# Patient Record
Sex: MALE | Race: WHITE | NOT HISPANIC OR LATINO | Employment: OTHER | URBAN - NONMETROPOLITAN AREA
[De-identification: names, ages, dates, MRNs, and addresses within clinical notes are randomized per-mention and may not be internally consistent; named-entity substitution may affect disease eponyms.]

---

## 2021-01-01 ENCOUNTER — APPOINTMENT (OUTPATIENT)
Dept: CT IMAGING | Facility: HOSPITAL | Age: 86
End: 2021-01-01

## 2021-01-01 ENCOUNTER — LAB (OUTPATIENT)
Dept: LAB | Facility: HOSPITAL | Age: 86
End: 2021-01-01

## 2021-01-01 ENCOUNTER — APPOINTMENT (OUTPATIENT)
Dept: GENERAL RADIOLOGY | Facility: HOSPITAL | Age: 86
End: 2021-01-01

## 2021-01-01 ENCOUNTER — HOSPITAL ENCOUNTER (EMERGENCY)
Facility: HOSPITAL | Age: 86
Discharge: ANOTHER HOSPITAL | End: 2021-10-24
Attending: EMERGENCY MEDICINE | Admitting: EMERGENCY MEDICINE

## 2021-01-01 ENCOUNTER — TRANSCRIBE ORDERS (OUTPATIENT)
Dept: LAB | Facility: HOSPITAL | Age: 86
End: 2021-01-01

## 2021-01-01 ENCOUNTER — HOSPITAL ENCOUNTER (EMERGENCY)
Facility: HOSPITAL | Age: 86
Discharge: HOME | End: 2021-12-08
Attending: EMERGENCY MEDICINE | Admitting: EMERGENCY MEDICINE

## 2021-01-01 ENCOUNTER — HOSPITAL ENCOUNTER (EMERGENCY)
Facility: HOSPITAL | Age: 86
Discharge: HOME | End: 2021-11-06
Attending: EMERGENCY MEDICINE | Admitting: EMERGENCY MEDICINE

## 2021-01-01 VITALS
OXYGEN SATURATION: 97 % | RESPIRATION RATE: 16 BRPM | SYSTOLIC BLOOD PRESSURE: 123 MMHG | HEIGHT: 67 IN | TEMPERATURE: 97.4 F | WEIGHT: 175 LBS | BODY MASS INDEX: 27.47 KG/M2 | DIASTOLIC BLOOD PRESSURE: 66 MMHG | HEART RATE: 70 BPM

## 2021-01-01 VITALS
WEIGHT: 168 LBS | SYSTOLIC BLOOD PRESSURE: 128 MMHG | RESPIRATION RATE: 16 BRPM | HEART RATE: 70 BPM | HEIGHT: 67 IN | BODY MASS INDEX: 26.37 KG/M2 | DIASTOLIC BLOOD PRESSURE: 75 MMHG | TEMPERATURE: 98 F | OXYGEN SATURATION: 100 %

## 2021-01-01 VITALS
HEIGHT: 67 IN | WEIGHT: 174 LBS | RESPIRATION RATE: 18 BRPM | HEART RATE: 67 BPM | BODY MASS INDEX: 27.31 KG/M2 | OXYGEN SATURATION: 100 % | DIASTOLIC BLOOD PRESSURE: 86 MMHG | SYSTOLIC BLOOD PRESSURE: 139 MMHG | TEMPERATURE: 98.1 F

## 2021-01-01 DIAGNOSIS — H11.32 SUBCONJUNCTIVAL HEMORRHAGE OF LEFT EYE: ICD-10-CM

## 2021-01-01 DIAGNOSIS — Z20.822 COVID-19 RULED OUT: ICD-10-CM

## 2021-01-01 DIAGNOSIS — S00.83XA TRAUMATIC HEMATOMA OF FOREHEAD, INITIAL ENCOUNTER: ICD-10-CM

## 2021-01-01 DIAGNOSIS — S50.312A ELBOW ABRASION, LEFT, INITIAL ENCOUNTER: ICD-10-CM

## 2021-01-01 DIAGNOSIS — Z20.822 COVID-19 RULED OUT: Primary | ICD-10-CM

## 2021-01-01 DIAGNOSIS — S06.5XAA SUBDURAL HEMATOMA (HCC): ICD-10-CM

## 2021-01-01 DIAGNOSIS — S01.81XA FOREHEAD LACERATION, INITIAL ENCOUNTER: Primary | ICD-10-CM

## 2021-01-01 DIAGNOSIS — S01.01XA LACERATION OF SCALP, INITIAL ENCOUNTER: ICD-10-CM
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
## 2021-01-01 DIAGNOSIS — S33.5XXA LUMBAR SPRAIN, INITIAL ENCOUNTER: Primary | ICD-10-CM

## 2021-01-01 DIAGNOSIS — S22.030A COMPRESSION FRACTURE OF T3 VERTEBRA, INITIAL ENCOUNTER (HCC): Primary | ICD-10-CM

## 2021-01-01 DIAGNOSIS — R29.6 FREQUENT FALLS: ICD-10-CM

## 2021-01-01 LAB
ALBUMIN SERPL-MCNC: 3.3 G/DL (ref 3.5–5.2)
ALBUMIN/GLOB SERPL: 1.4 G/DL
ALP SERPL-CCNC: 107 U/L (ref 39–117)
ALT SERPL W P-5'-P-CCNC: 16 U/L (ref 1–41)
ANION GAP SERPL CALCULATED.3IONS-SCNC: 6.7 MMOL/L (ref 5–15)
AST SERPL-CCNC: 22 U/L (ref 1–40)
BASOPHILS # BLD AUTO: 0.03 10*3/MM3 (ref 0–0.2)
BASOPHILS NFR BLD AUTO: 0.6 % (ref 0–1.5)
BILIRUB SERPL-MCNC: 0.2 MG/DL (ref 0–1.2)
BUN SERPL-MCNC: 27 MG/DL (ref 8–23)
BUN/CREAT SERPL: 19.1 (ref 7–25)
CALCIUM SPEC-SCNC: 8.3 MG/DL (ref 8.2–9.6)
CHLORIDE SERPL-SCNC: 110 MMOL/L (ref 98–107)
CO2 SERPL-SCNC: 25.3 MMOL/L (ref 22–29)
CREAT SERPL-MCNC: 1.41 MG/DL (ref 0.76–1.27)
DEPRECATED RDW RBC AUTO: 62.9 FL (ref 37–54)
EOSINOPHIL # BLD AUTO: 0.38 10*3/MM3 (ref 0–0.4)
EOSINOPHIL NFR BLD AUTO: 7.4 % (ref 0.3–6.2)
ERYTHROCYTE [DISTWIDTH] IN BLOOD BY AUTOMATED COUNT: 17.2 % (ref 12.3–15.4)
GFR SERPL CREATININE-BSD FRML MDRD: 47 ML/MIN/1.73
GLOBULIN UR ELPH-MCNC: 2.3 GM/DL
GLUCOSE SERPL-MCNC: 106 MG/DL (ref 65–99)
HCT VFR BLD AUTO: 29.9 % (ref 37.5–51)
HGB BLD-MCNC: 9.6 G/DL (ref 13–17.7)
IMM GRANULOCYTES # BLD AUTO: 0.02 10*3/MM3 (ref 0–0.05)
IMM GRANULOCYTES NFR BLD AUTO: 0.4 % (ref 0–0.5)
LYMPHOCYTES # BLD AUTO: 1.37 10*3/MM3 (ref 0.7–3.1)
LYMPHOCYTES NFR BLD AUTO: 26.6 % (ref 19.6–45.3)
MCH RBC QN AUTO: 32.2 PG (ref 26.6–33)
MCHC RBC AUTO-ENTMCNC: 32.1 G/DL (ref 31.5–35.7)
MCV RBC AUTO: 100.3 FL (ref 79–97)
MONOCYTES # BLD AUTO: 0.56 10*3/MM3 (ref 0.1–0.9)
MONOCYTES NFR BLD AUTO: 10.9 % (ref 5–12)
NEUTROPHILS NFR BLD AUTO: 2.8 10*3/MM3 (ref 1.7–7)
NEUTROPHILS NFR BLD AUTO: 54.1 % (ref 42.7–76)
NRBC BLD AUTO-RTO: 0 /100 WBC (ref 0–0.2)
PLATELET # BLD AUTO: 107 10*3/MM3 (ref 140–450)
PMV BLD AUTO: 9.4 FL (ref 6–12)
POTASSIUM SERPL-SCNC: 4.7 MMOL/L (ref 3.5–5.2)
PROT SERPL-MCNC: 5.6 G/DL (ref 6–8.5)
RBC # BLD AUTO: 2.98 10*6/MM3 (ref 4.14–5.8)
SARS-COV-2 RNA NOSE QL NAA+PROBE: NOT DETECTED
SODIUM SERPL-SCNC: 142 MMOL/L (ref 136–145)
WBC # BLD AUTO: 5.16 10*3/MM3 (ref 3.4–10.8)

## 2021-01-01 PROCEDURE — 72125 CT NECK SPINE W/O DYE: CPT

## 2021-01-01 PROCEDURE — U0004 COV-19 TEST NON-CDC HGH THRU: HCPCS

## 2021-01-01 PROCEDURE — 70486 CT MAXILLOFACIAL W/O DYE: CPT

## 2021-01-01 PROCEDURE — 72131 CT LUMBAR SPINE W/O DYE: CPT

## 2021-01-01 PROCEDURE — C9803 HOPD COVID-19 SPEC COLLECT: HCPCS

## 2021-01-01 PROCEDURE — 73080 X-RAY EXAM OF ELBOW: CPT

## 2021-01-01 PROCEDURE — 99283 EMERGENCY DEPT VISIT LOW MDM: CPT

## 2021-01-01 PROCEDURE — 72220 X-RAY EXAM SACRUM TAILBONE: CPT

## 2021-01-01 PROCEDURE — 70450 CT HEAD/BRAIN W/O DYE: CPT

## 2021-01-01 PROCEDURE — 80053 COMPREHEN METABOLIC PANEL: CPT | Performed by: PHYSICIAN ASSISTANT

## 2021-01-01 PROCEDURE — 71250 CT THORAX DX C-: CPT

## 2021-01-01 PROCEDURE — 72128 CT CHEST SPINE W/O DYE: CPT

## 2021-01-01 PROCEDURE — 72100 X-RAY EXAM L-S SPINE 2/3 VWS: CPT

## 2021-01-01 PROCEDURE — 85025 COMPLETE CBC W/AUTO DIFF WBC: CPT | Performed by: PHYSICIAN ASSISTANT

## 2021-01-01 PROCEDURE — 74176 CT ABD & PELVIS W/O CONTRAST: CPT

## 2021-01-01 RX ORDER — BACITRACIN ZINC 500 [USP'U]/G
1 OINTMENT TOPICAL ONCE
Status: COMPLETED | OUTPATIENT
Start: 2021-01-01 | End: 2021-01-01

## 2021-01-01 RX ORDER — LIDOCAINE HYDROCHLORIDE 10 MG/ML
10 INJECTION, SOLUTION INFILTRATION; PERINEURAL ONCE
Status: DISCONTINUED | OUTPATIENT
Start: 2021-01-01 | End: 2021-01-01 | Stop reason: HOSPADM

## 2021-01-01 RX ORDER — LIDOCAINE HYDROCHLORIDE AND EPINEPHRINE 10; 10 MG/ML; UG/ML
10 INJECTION, SOLUTION INFILTRATION; PERINEURAL ONCE
Status: COMPLETED | OUTPATIENT
Start: 2021-01-01 | End: 2021-01-01

## 2021-01-01 RX ORDER — ACETAMINOPHEN 500 MG
1000 TABLET ORAL ONCE
Status: COMPLETED | OUTPATIENT
Start: 2021-01-01 | End: 2021-01-01

## 2021-01-01 RX ADMIN — LIDOCAINE HYDROCHLORIDE,EPINEPHRINE BITARTRATE 10 ML: 10; .01 INJECTION, SOLUTION INFILTRATION; PERINEURAL at 18:17

## 2021-01-01 RX ADMIN — ACETAMINOPHEN 1000 MG: 500 TABLET ORAL at 13:16

## 2021-01-01 RX ADMIN — BACITRACIN ZINC 1 APPLICATION: 500 OINTMENT TOPICAL at 16:07

## 2021-10-25 NOTE — ED PROVIDER NOTES
Subjective   89-year-old male presents with a fall, he fell earlier today hitting his head and has a knot on his right side of his head.  He also fell about 30 minutes before that fall landing on his left elbow.  He had a fall several days ago landing on his right elbow and has a bandage from a skin tear.  He is here with his daughter who states he is following 176 times since 2014.  He has had multiple falls recently and is become concerning.  He is also concerned because his blood pressures have been low at home, he is on several blood pressure medications and when he takes his blood pressure at home he notices systolic is lower and he is concerned this may be causing some of his falls, his daughter states that they were told that recently by his physician.  He is only complaining of pain in the forehead area where his knot is.      History provided by:  Patient and relative   used: No        Review of Systems   HENT:        Knot on forehead   Skin:        Skin tear right arm   Neurological: Positive for weakness.   All other systems reviewed and are negative.      Past Medical History:   Diagnosis Date   • Arthritis    • CHF (congestive heart failure) (HCC)    • Sleep apnea        No Known Allergies    Past Surgical History:   Procedure Laterality Date   • BACK SURGERY     • JOINT REPLACEMENT     • PACEMAKER IMPLANTATION         History reviewed. No pertinent family history.    Social History     Socioeconomic History   • Marital status:    Tobacco Use   • Smoking status: Never Smoker   Substance and Sexual Activity   • Alcohol use: Never   • Drug use: Never           Objective   Physical Exam  Vitals and nursing note reviewed.   Constitutional:       Appearance: He is well-developed.   HENT:      Head: Normocephalic and atraumatic.        Comments: Proximately 2 cm hematoma right forehead  Cardiovascular:      Rate and Rhythm: Normal rate and regular rhythm.   Pulmonary:      Effort:  Pulmonary effort is normal.      Breath sounds: Normal breath sounds.   Abdominal:      General: Bowel sounds are normal.      Palpations: Abdomen is soft.   Musculoskeletal:      Cervical back: Normal range of motion.   Skin:     General: Skin is warm and dry.   Neurological:      Mental Status: He is alert and oriented to person, place, and time.   Psychiatric:         Behavior: Behavior normal.         Thought Content: Thought content normal.         Procedures           ED Course  ED Course as of 10/24/21 2248   Sun Oct 24, 2021   2239 Discussed the case with Dr. Young  trauma service, she accepted the patient for transfer [CS]      ED Course User Index  [CS] Ajay Ortiz Jr., PA-C                                           MDM  Number of Diagnoses or Management Options  Compression fracture of T3 vertebra, initial encounter (HCC): new and requires workup  Subdural hematoma (HCC): new and requires workup     Amount and/or Complexity of Data Reviewed  Clinical lab tests: reviewed  Tests in the radiology section of CPT®: reviewed    Risk of Complications, Morbidity, and/or Mortality  Presenting problems: minimal  Diagnostic procedures: minimal    Patient Progress  Patient progress: stable      Final diagnoses:   Compression fracture of T3 vertebra, initial encounter (HCC)   Subdural hematoma (HCC)       ED Disposition  ED Disposition     ED Disposition Condition Comment    Transfer to Another Facility             No follow-up provider specified.       Medication List      No changes were made to your prescriptions during this visit.          Ajay Ortiz Jr., PA-C  10/24/21 2248

## 2021-11-06 NOTE — ED PROVIDER NOTES
Subjective   History of Present Illness   Patient is a 89-year-old male with history of CHF, sleep apnea, arthritis, and frequent falls presenting to the ER with complaints of head trauma and left elbow pain secondary to a fall at home.  Patient states that he was trying to clean the stove top and the floor came up and hit him.  He states that he lost his balance and hit his head on the top of the countertop. He states he has cuts to his forehead and left elbow. He denies pain anywhere else and denies additional injuries/complaints at this time. Per review of records, patient was admitted at  from 10/25/21-10/26/21 for subdural hematoma, vertebral compression fracture, and rib fractures secondary to a fall. Per previous notes, patient has reportedly had 176 falls since 2014. Patient was evaluated by OT and PT during hospitalization and home with assistance recommended. He usually takes aspirin daily but has not taken it since  Admission at  for previous fall (10/26/21) as instructed by providers upon discharge. He states he was supposed to start taking it again tomorrow.     Review of Systems   HENT:        Head trauma, blood to left eye   Skin: Positive for wound (left elbow and forehead).   All other systems reviewed and are negative.      Past Medical History:   Diagnosis Date   • Arthritis    • CHF (congestive heart failure) (HCC)    • Falls frequently    • Sleep apnea        No Known Allergies    Past Surgical History:   Procedure Laterality Date   • BACK SURGERY     • JOINT REPLACEMENT     • PACEMAKER IMPLANTATION         History reviewed. No pertinent family history.    Social History     Socioeconomic History   • Marital status:    Tobacco Use   • Smoking status: Never Smoker   Vaping Use   • Vaping Use: Never used   Substance and Sexual Activity   • Alcohol use: Never   • Drug use: Never   • Sexual activity: Not Currently           Objective   Physical Exam  Vitals and nursing note reviewed.    Constitutional:       General: He is not in acute distress.     Appearance: He is normal weight. He is not toxic-appearing.   HENT:      Head:      Comments: Large linear laceration to forehead which will require sutures, skin tear to left elbow (not amenable to suturing), no obvious hematomas to head     Right Ear: External ear normal.      Left Ear: External ear normal.      Nose: Nose normal.   Eyes:      Extraocular Movements: Extraocular movements intact.      Pupils: Pupils are equal, round, and reactive to light.      Comments: Subconjunctival hematoma to left eye, PERRl, EOM intact, mild swelling of the periocular region of left eye   Cardiovascular:      Rate and Rhythm: Normal rate.      Heart sounds: Normal heart sounds.   Pulmonary:      Effort: No respiratory distress.      Breath sounds: Normal breath sounds. No wheezing.   Abdominal:      General: There is no distension.      Palpations: Abdomen is soft.      Tenderness: There is no abdominal tenderness.   Musculoskeletal:         General: Tenderness (left elbow) present. No swelling or deformity. Normal range of motion.      Cervical back: Normal range of motion and neck supple. No tenderness.   Skin:     General: Skin is warm and dry.      Comments: Skin tear to left elbow, large linear laceration to left forehead    Neurological:      General: No focal deficit present.      Mental Status: He is alert and oriented to person, place, and time.      Cranial Nerves: No cranial nerve deficit.   Psychiatric:         Mood and Affect: Mood normal.         Behavior: Behavior normal.      Comments: Nishi jame         Laceration Repair    Date/Time: 11/6/2021 3:38 PM  Performed by: Yuly Tamayo PA-C  Authorized by: Tee Her DO     Consent:     Consent obtained:  Verbal    Consent given by:  Patient    Risks discussed:  Infection, need for additional repair, pain, poor cosmetic result, poor wound healing, nerve damage, retained foreign body,  tendon damage and vascular damage    Alternatives discussed:  No treatment, delayed treatment, observation and referral  Anesthesia (see MAR for exact dosages):     Anesthesia method:  Local infiltration    Local anesthetic:  Lidocaine 1% w/o epi  Laceration details:     Location:  Face    Face location:  Forehead    Length (cm):  6    Depth (mm):  1  Repair type:     Repair type:  Simple  Pre-procedure details:     Preparation:  Patient was prepped and draped in usual sterile fashion and imaging obtained to evaluate for foreign bodies  Treatment:     Area cleansed with:  Hibiclens    Amount of cleaning:  Extensive    Irrigation solution:  Tap water    Irrigation volume:  1000    Irrigation method:  Pressure wash    Visualized foreign bodies/material removed: no    Skin repair:     Repair method:  Sutures    Suture size:  6-0    Suture material:  Nylon    Suture technique:  Simple interrupted    Number of sutures:  12  Approximation:     Approximation:  Close  Post-procedure details:     Dressing:  Antibiotic ointment and non-adherent dressing    Patient tolerance of procedure:  Tolerated well, no immediate complications  Comments:      Advised wound recheck and suture removal in 5 days.  Wound Care    Date/Time: 11/6/2021 3:39 PM  Performed by: Yuly Tamayo PA-C  Authorized by: Tee Her DO     Consent:     Consent obtained:  Verbal    Consent given by:  Patient    Risks discussed:  Bleeding, infection, pain and poor cosmetic result    Alternatives discussed:  No treatment, delayed treatment, alternative treatment and observation  Anesthesia (see MAR for exact dosages):     Anesthesia method:  None  Procedure details:     Indications: open wounds      Wound exploration location: extremity      Wound exploration location comment:  Left elbow    Wound age (days):  <1    Wound surface area (sq cm):  3  Skin layer closed with:     Dehiscence repair type: simple closure      Wound care performed: Skin adhesive  glue.  Dressing:     Dressing applied:  2x2 and Kerlix  Post-procedure details:     Patient tolerance of procedure:  Tolerated well, no immediate complications               ED Course  ED Course as of 11/06/21 1552   Sat Nov 06, 2021   1537 Narrative & Impression  PROCEDURE: XR ELBOW 3+ VW LEFT-     HISTORY: Abrasion and elbow pain secondary to fall     COMPARISON: None.     FINDINGS:  A three view exam demonstrates no acute fracture or  dislocation. The joint spaces appear unremarkable. No radiopaque foreign  body identified.     IMPRESSION:  No acute bony abnormality.           This report was finalized on 11/6/2021 2:04 PM by Nahomy Ashton MD.          Specimen Collected: 11/06/21 14:04         [AP]   1537 Narrative & Impression  PROCEDURE: CT FACIAL BONES WO CONTRAST-     HISTORY: Fall with head trauma      COMPARISON: 10/24/2021     TECHNIQUE: Multiple axial CT sections were performed through the face  without enhancement. Coronal reconstruction images were performed. This  study was performed with techniques to keep radiation doses as low as  reasonably achievable, (ALARA). Individualized dose reduction techniques  using automated exposure control or adjustment of mA and/or kV according  to the patient size were employed.     FINDINGS: There is a small linear lucency through the tip of the nasal  bone consistent with a nondisplaced fracture. There is also small  fracture through the tip of the anterior maxillary spine. These  fractures are stable from the prior exam. The orbital floor is intact.  No air-fluid levels are identified. No significant soft tissue swelling  is identified. There is a small amount of mucoperiosteal thickening  present in the ethmoids, frontal and maxillary sinuses bilaterally. No  mandibular dislocation seen. Mastoids are clear.     IMPRESSION:  Stable nasal bone and anterior maxillary spine fractures  from prior exam     Sinus disease as described with no air-fluid level     This  report was finalized on 11/6/2021 1:38 PM by Nahomy Ashton MD.          Specimen Collected: 11/06/21 13:35         [AP]   1538 Narrative & Impression  PROCEDURE: CT HEAD WO CONTRAST-     HISTORY: Fall with head trauma, recent admission at  for subdural  hematoma     COMPARISON: 10/24/2021.     TECHNIQUE: Multiple axial CT images were performed from the foramen  magnum to the vertex. Individualized dose reduction techniques using  automated exposure control or adjustment of mA and/or kV according to  the patient size were employed.      FINDINGS: There is moderate, age-appropriate generalized cerebral  atrophy. The ventricles are enlarged. There is moderate small vessel  ischemic disease similar to prior. There is a new left 3 cm frontal  scalp hematoma. The 5 mm area of increased attenuation along the  anterior falx appears stable. This may represent a simple seen subdural  hematoma although it has not decreased in size compared to prior study  which would be expected. Continued follow-up may be helpful. There is no  evidence of edema or hemorrhage.  No masses are identified. No  extra-axial fluid is seen. The paranasal sinuses are unremarkable. . As  described right frontal scalp hematoma no longer seen.     IMPRESSION:  New left frontal scalp hematoma     5 mm area of increased attenuation anterior falx stable from the prior  exam, small falcine subdural hematoma still a consideration        This report was finalized on 11/6/2021 1:34 PM by Nahomy Ashton MD. [AP]      ED Course User Index  [AP] Yuly Tamayo, KRISS                                           [facility]   Patient was evaluated in the ER for head trauma after a fall.  Vitals are within normal limits.  Patient is oriented with no focal neurologic deficits.  CT scan of head and face with findings as described above which are stable from previous CTs with new findings of left frontal scalp hematoma as well as mucoperiosteal thickening of the sinuses.  Patient  denies any fever or symptoms of sinus infection.  He was advised to follow-up with his PCP regarding this finding.  X-ray of left elbow reveals no acute bony abnormalities.  Patient had laceration to forehead which was repaired.  See procedure note for further details.  Patient also had a superficial skin tear to his left elbow which was repaired using skin adhesive.  Tetanus is up-to-date per patient.  Wound care was discussed with the patient.  He was advised to follow-up with his PCP in 5 days for wound recheck and suture removal.  Patient was also advised to follow-up with neurosurgery regarding stable subdural hematoma.  He states that he has an upcoming appointment scheduled with them.  Precautions were given for return to the ER for any new or worsening symptoms.    Final diagnoses:   Forehead laceration, initial encounter   Elbow abrasion, left, initial encounter   Fall, initial encounter   Frequent falls   Subconjunctival hemorrhage of left eye   Traumatic hematoma of forehead, initial encounter       ED Disposition  ED Disposition     ED Disposition Condition Comment    Discharge Stable           Provider, No Known  [facility] [id number] [phone]    Schedule an appointment as soon as possible for a visit   for re-evaluation of today's complaint, For suture removal, For wound re-check    [facility] Emergency Department  [address] [address] [phone]  Go to   As needed, If symptoms worsen    Neurosurgery    Schedule an appointment as soon as possible for a visit   for re-evaluation of today's complaint     [facility]  [address] [facility] [place] [address]  [phone]  Schedule an appointment as soon as possible for a visit   for re-evaluation of subconjunctival hemorrhage of left eye secondary to trauma         Medication List      No changes were made to your prescriptions during this visit.          Yuyl Tamayo,  KRISS  11/06/21 1552

## 2021-12-08 NOTE — ED PROVIDER NOTES
Subjective   89-year-old male presenting with fall.  Patient states that he was trying to put his walker in the back of his car when he lost his balance and fell backwards.  He struck the back of his head.  He did not lose consciousness.  He has no complaints at this time.  He initially complained of some low back pain.  He is on aspirin.          Review of Systems   Constitutional: Negative.    HENT: Negative.    Eyes: Negative.    Respiratory: Negative.    Cardiovascular: Negative.    Gastrointestinal: Negative.    Genitourinary: Negative.    Musculoskeletal: Positive for back pain.   Skin: Positive for wound.   Neurological: Negative.    Psychiatric/Behavioral: Negative.        Past Medical History:   Diagnosis Date   • Arthritis    • CHF (congestive heart failure) (HCC)    • Falls frequently    • Sleep apnea        No Known Allergies    Past Surgical History:   Procedure Laterality Date   • BACK SURGERY     • JOINT REPLACEMENT     • PACEMAKER IMPLANTATION         History reviewed. No pertinent family history.    Social History     Socioeconomic History   • Marital status:    Tobacco Use   • Smoking status: Never Smoker   Vaping Use   • Vaping Use: Never used   Substance and Sexual Activity   • Alcohol use: Never   • Drug use: Never   • Sexual activity: Not Currently           Objective   Physical Exam  Vitals reviewed.   Constitutional:       General: He is not in acute distress.     Appearance: Normal appearance. He is not ill-appearing, toxic-appearing or diaphoretic.   HENT:      Head: Normocephalic and atraumatic.      Right Ear: External ear normal.      Left Ear: External ear normal.      Nose: Nose normal.      Mouth/Throat:      Mouth: Mucous membranes are moist.      Pharynx: Oropharynx is clear.   Eyes:      Extraocular Movements: Extraocular movements intact.      Conjunctiva/sclera: Conjunctivae normal.      Pupils: Pupils are equal, round, and reactive to light.   Cardiovascular:      Rate  and Rhythm: Normal rate and regular rhythm.      Pulses: Normal pulses.      Heart sounds: Normal heart sounds.   Pulmonary:      Effort: Pulmonary effort is normal. No respiratory distress.      Breath sounds: Normal breath sounds.   Abdominal:      General: Bowel sounds are normal. There is no distension.      Tenderness: There is no abdominal tenderness.   Musculoskeletal:         General: No swelling or deformity. Normal range of motion.      Cervical back: Normal range of motion and neck supple.      Comments: Mild tenderness over the lower lumbar spine   Skin:     General: Skin is warm and dry.      Capillary Refill: Capillary refill takes less than 2 seconds.      Findings: No rash.      Comments: Laceration of the posterior scalp   Neurological:      General: No focal deficit present.      Mental Status: He is alert and oriented to person, place, and time.      Comments: Normal strength and sensation   Psychiatric:         Mood and Affect: Mood normal.         Behavior: Behavior normal.         Laceration Repair    Date/Time: 12/8/2021 6:41 PM  Performed by: Sunedep Childress MD  Authorized by: Sundeep Childress MD     Consent:     Consent obtained:  Verbal    Consent given by:  Patient    Risks discussed:  Infection, need for additional repair, pain, poor cosmetic result and poor wound healing    Alternatives discussed:  No treatment  Anesthesia (see MAR for exact dosages):     Anesthesia method:  Local infiltration    Local anesthetic:  Lidocaine 1% WITH epi  Laceration details:     Location:  Scalp    Scalp location:  Crown    Length (cm):  2    Depth (mm):  6  Repair type:     Repair type:  Simple  Pre-procedure details:     Preparation:  Patient was prepped and draped in usual sterile fashion  Exploration:     Hemostasis achieved with:  Epinephrine and direct pressure    Wound exploration: entire depth of wound probed and visualized      Wound extent: no foreign bodies/material noted and no  underlying fracture noted      Contaminated: no    Treatment:     Wound cleansed with: Alcohol swab.    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 Severe  atrophy and chronic ischemic white matter changes are noted.     No cortical edema is present. There is no mass or hemorrhage. Ventricles  are normal.     Bone windows show no skull fracture or obvious destructive lesion.     IMPRESSION:  1. No acute intracranial abnormality or obvious mass.   2. Atrophy and chronic ischemic white matter changes as above.        This study was performed with techniques to keep radiation doses as low  as reasonably achievable (ALARA). Individualized dose reduction  techniques using automated exposure control or adjustment of vA and/or  kV according to the patient size were employed.      This report was finalized on 12/8/2021 4:27 PM by Yusuf Juárez MD.          Specimen Collected: 12/08/21 16:27 Last Resulted: 12/08/21 16:27         [PF]   2021 FINAL REPORT     CLINICAL HISTORY:  fall, pain     FINDINGS:  Sacrum series   Three views demonstrate no acute fracture or  dislocation. The joint spaces are unremarkable.     IMPRESSION:  No acute process.     Authenticated by Siddhartha Amaral MD on 12/08/2021 05:28:11 PM          Specimen Collected: 12/08/21 17:28 Last Resulted: 12/08/21 17:28         [PF]   2021    CLINICAL HISTORY:  fall, pain     FINDINGS:  Lumbar spine series.  Findings: 3 views  "were obtained.  Status  post posterior screw fixation at L3-L4 L5-S1.  A stimulator is  noted.  There is grade 2 anterolisthesis at L5-S1.  There is  severe diffuse degenerative disc disease.  There is marked disc  space narrowing and endplate change at L2-3.  There is disc  space narrowing at L1-2.  Destructive changes are present at  T12-L1 with sclerosis and collapse similar to a CT dated October 2021.  A component of pathologic fracture and  discitis/osteomyelitis should be considered.     IMPRESSION:  Severe degenerative disc disease similar to the recent CT.  Possible discitis/osteomyelitis at T12-L1.     Authenticated by Siddhartha Amaral MD on 12/08/2021 05:30:20 PM [PF]   2022 TECHNIQUE:  Axial CT images were obtained through the lumbar spine. Sagittal and coronal reformatted images were   generated from the axial data set and provided for interpretation. This study was performed with techniques to   keep radiation doses as low as reasonably achievable (ALARA). Individualized dose reduction techniques using   automated exposure control or adjustment of mA and/or kV according to the patient's size were employed.  CLINICAL HISTORY:  fall, abnormal xray, h/o \"hole in my vertebrae\"  FINDINGS:  There is no acute fracture or malalignment of the lumbar spine. The lumbar lordosis is preserved. There are   chronic compression deformities of T12 and L1. The patient is status post posterior fusion and laminectomy from   L3-S1 and severe multilevel degenerative changes are present. No acute paraspinal abnormalities.  IMPRESSION:  No acute abnormality.  Reviewed, Interpreted and Dictated by Chino Klein, Transcribed by Declan Raymundo  Signed by Chino Klein on 12/8/2021 8:08:51 PM, [facility] [PF]      ED Course User Index  [PF] Tee Her,                                                  MDM  Number of Diagnoses or Management Options  Diagnosis management comments: 89-year-old male with fall.  Well-developed, " well-nourished elderly man in no distress with exam as above.  His exam is notable for laceration to the posterior scalp and some lumbar tenderness.  Will obtain imaging.  Wound will need to be repaired.  Disposition pending.    DDx: Fall, laceration, contusion, fracture, ICH    CT of the head is negative.  X-rays per radiology reveal some abnormality of the lumbar spine, there is a question raised of discitis versus osteomyelitis versus fracture.  Will obtain CT scan to further delineate.  Wound repaired.  Disposition pending.    20:36 EST  I received care from Dr. Childress in regards to follow-up of CT imaging of the lumbar spine.  89-year-old male presents status post fall, had 4 staples placed to the posterior scalp prior to my evaluation.  Patient is neurovascularly intact.  CT lumbar spine reveal no acute abnormality, with lower lumbar degenerative changes status post fusion and laminectomy, stable compression fractures T12-L1.  Patient discharged home stable condition advised supportive care outpatient follow-up for staple removal in 1 week.  Return precautions discussed.  Final diagnoses:   Lumbar sprain, initial encounter   Laceration of scalp, initial encounter          Tee Her,   12/08/21 2036

## 2022-01-01 ENCOUNTER — APPOINTMENT (OUTPATIENT)
Dept: GENERAL RADIOLOGY | Facility: HOSPITAL | Age: 87
End: 2022-01-01

## 2022-01-01 ENCOUNTER — APPOINTMENT (OUTPATIENT)
Dept: CT IMAGING | Facility: HOSPITAL | Age: 87
End: 2022-01-01

## 2022-01-01 ENCOUNTER — NURSING HOME (OUTPATIENT)
Dept: INTERNAL MEDICINE | Facility: CLINIC | Age: 87
End: 2022-01-01

## 2022-01-01 ENCOUNTER — DOCUMENTATION (OUTPATIENT)
Dept: FAMILY MEDICINE CLINIC | Facility: CLINIC | Age: 87
End: 2022-01-01

## 2022-01-01 ENCOUNTER — HOSPITAL ENCOUNTER (EMERGENCY)
Facility: HOSPITAL | Age: 87
Discharge: ANOTHER HOSPITAL | End: 2022-02-15
Attending: EMERGENCY MEDICINE | Admitting: EMERGENCY MEDICINE

## 2022-01-01 ENCOUNTER — TELEPHONE (OUTPATIENT)
Dept: INTERNAL MEDICINE | Facility: CLINIC | Age: 87
End: 2022-01-01

## 2022-01-01 ENCOUNTER — HOSPITAL ENCOUNTER (EMERGENCY)
Facility: HOSPITAL | Age: 87
Discharge: HOME | End: 2022-02-12
Attending: EMERGENCY MEDICINE | Admitting: EMERGENCY MEDICINE

## 2022-01-01 ENCOUNTER — HOSPITAL ENCOUNTER (EMERGENCY)
Facility: HOSPITAL | Age: 87
Discharge: HOME | End: 2022-02-15
Attending: FAMILY MEDICINE | Admitting: FAMILY MEDICINE

## 2022-01-01 ENCOUNTER — OFFICE VISIT (OUTPATIENT)
Dept: INTERNAL MEDICINE | Facility: CLINIC | Age: 87
End: 2022-01-01

## 2022-01-01 VITALS
OXYGEN SATURATION: 100 % | TEMPERATURE: 98.4 F | RESPIRATION RATE: 16 BRPM | BODY MASS INDEX: 28.72 KG/M2 | HEART RATE: 74 BPM | HEIGHT: 67 IN | SYSTOLIC BLOOD PRESSURE: 102 MMHG | WEIGHT: 183 LBS | DIASTOLIC BLOOD PRESSURE: 60 MMHG

## 2022-01-01 VITALS
TEMPERATURE: 97.7 F | RESPIRATION RATE: 20 BRPM | WEIGHT: 143.1 LBS | OXYGEN SATURATION: 97 % | BODY MASS INDEX: 22.41 KG/M2 | SYSTOLIC BLOOD PRESSURE: 124 MMHG | HEART RATE: 86 BPM | DIASTOLIC BLOOD PRESSURE: 66 MMHG

## 2022-01-01 VITALS
TEMPERATURE: 97.2 F | DIASTOLIC BLOOD PRESSURE: 60 MMHG | RESPIRATION RATE: 18 BRPM | BODY MASS INDEX: 22.07 KG/M2 | SYSTOLIC BLOOD PRESSURE: 120 MMHG | OXYGEN SATURATION: 97 % | WEIGHT: 140.9 LBS | HEART RATE: 86 BPM

## 2022-01-01 VITALS
TEMPERATURE: 97.2 F | WEIGHT: 141.2 LBS | SYSTOLIC BLOOD PRESSURE: 130 MMHG | OXYGEN SATURATION: 94 % | BODY MASS INDEX: 22.12 KG/M2 | DIASTOLIC BLOOD PRESSURE: 81 MMHG | HEART RATE: 70 BPM | RESPIRATION RATE: 20 BRPM

## 2022-01-01 VITALS
BODY MASS INDEX: 30.48 KG/M2 | TEMPERATURE: 98.2 F | OXYGEN SATURATION: 97 % | SYSTOLIC BLOOD PRESSURE: 127 MMHG | HEART RATE: 70 BPM | RESPIRATION RATE: 15 BRPM | WEIGHT: 172 LBS | DIASTOLIC BLOOD PRESSURE: 80 MMHG | HEIGHT: 63 IN

## 2022-01-01 VITALS
HEART RATE: 74 BPM | WEIGHT: 143 LBS | RESPIRATION RATE: 18 BRPM | BODY MASS INDEX: 22.4 KG/M2 | OXYGEN SATURATION: 94 % | SYSTOLIC BLOOD PRESSURE: 122 MMHG | DIASTOLIC BLOOD PRESSURE: 60 MMHG | TEMPERATURE: 97.2 F

## 2022-01-01 VITALS
TEMPERATURE: 97.8 F | BODY MASS INDEX: 27 KG/M2 | HEIGHT: 67 IN | HEART RATE: 69 BPM | RESPIRATION RATE: 16 BRPM | SYSTOLIC BLOOD PRESSURE: 122 MMHG | DIASTOLIC BLOOD PRESSURE: 72 MMHG | WEIGHT: 172 LBS | OXYGEN SATURATION: 98 %

## 2022-01-01 VITALS
OXYGEN SATURATION: 93 % | HEART RATE: 70 BPM | HEIGHT: 67 IN | DIASTOLIC BLOOD PRESSURE: 74 MMHG | RESPIRATION RATE: 18 BRPM | TEMPERATURE: 97.8 F | BODY MASS INDEX: 27 KG/M2 | WEIGHT: 172 LBS | SYSTOLIC BLOOD PRESSURE: 116 MMHG

## 2022-01-01 DIAGNOSIS — I10 ESSENTIAL HYPERTENSION: ICD-10-CM

## 2022-01-01 DIAGNOSIS — I35.0 AORTIC VALVE STENOSIS, ETIOLOGY OF CARDIAC VALVE DISEASE UNSPECIFIED: ICD-10-CM

## 2022-01-01 DIAGNOSIS — R54 FRAILTY: ICD-10-CM

## 2022-01-01 DIAGNOSIS — R29.6 RECURRENT FALLS: Primary | ICD-10-CM

## 2022-01-01 DIAGNOSIS — G30.9 ALZHEIMER'S DISEASE, UNSPECIFIED (CODE): ICD-10-CM

## 2022-01-01 DIAGNOSIS — Z23 NEED FOR COVID-19 VACCINE: Primary | ICD-10-CM

## 2022-01-01 DIAGNOSIS — S12.9XXD CLOSED FRACTURE OF CERVICAL VERTEBRA, UNSPECIFIED CERVICAL VERTEBRAL LEVEL, SUBSEQUENT ENCOUNTER: Primary | ICD-10-CM

## 2022-01-01 DIAGNOSIS — M75.02 ADHESIVE CAPSULITIS OF BOTH SHOULDERS: ICD-10-CM

## 2022-01-01 DIAGNOSIS — R29.6 RECURRENT FALLS: ICD-10-CM

## 2022-01-01 DIAGNOSIS — S01.91XA COMPLEX LACERATION OF FACE, INITIAL ENCOUNTER: ICD-10-CM

## 2022-01-01 DIAGNOSIS — S12.9XXD CLOSED FRACTURE OF CERVICAL VERTEBRA, UNSPECIFIED CERVICAL VERTEBRAL LEVEL, SUBSEQUENT ENCOUNTER: ICD-10-CM

## 2022-01-01 DIAGNOSIS — S41.112A SKIN TEAR OF LEFT UPPER ARM WITHOUT COMPLICATION, INITIAL ENCOUNTER: ICD-10-CM

## 2022-01-01 DIAGNOSIS — Z51.5 HOSPICE CARE PATIENT: ICD-10-CM

## 2022-01-01 DIAGNOSIS — E03.9 ACQUIRED HYPOTHYROIDISM: ICD-10-CM

## 2022-01-01 DIAGNOSIS — G30.9 ALZHEIMER'S DISEASE, UNSPECIFIED (CODE): Primary | ICD-10-CM

## 2022-01-01 DIAGNOSIS — S61.411A SKIN TEAR OF RIGHT HAND WITHOUT COMPLICATION, INITIAL ENCOUNTER: ICD-10-CM

## 2022-01-01 DIAGNOSIS — G47.33 OSA TREATED WITH BIPAP: ICD-10-CM

## 2022-01-01 DIAGNOSIS — R29.6 FREQUENT FALLS: ICD-10-CM

## 2022-01-01 DIAGNOSIS — D50.9 IRON DEFICIENCY ANEMIA, UNSPECIFIED IRON DEFICIENCY ANEMIA TYPE: ICD-10-CM

## 2022-01-01 DIAGNOSIS — S12.100A CLOSED ODONTOID FRACTURE, INITIAL ENCOUNTER: Primary | ICD-10-CM

## 2022-01-01 DIAGNOSIS — M19.90 ARTHRITIS: ICD-10-CM

## 2022-01-01 DIAGNOSIS — M75.01 ADHESIVE CAPSULITIS OF BOTH SHOULDERS: ICD-10-CM

## 2022-01-01 DIAGNOSIS — M79.672 FOOT PAIN, LEFT: ICD-10-CM

## 2022-01-01 DIAGNOSIS — S00.03XA HEMATOMA OF SCALP, INITIAL ENCOUNTER: ICD-10-CM

## 2022-01-01 DIAGNOSIS — S22.038A OTHER CLOSED FRACTURE OF THIRD THORACIC VERTEBRA, INITIAL ENCOUNTER: ICD-10-CM

## 2022-01-01 DIAGNOSIS — G40.909 SEIZURE DISORDER: ICD-10-CM

## 2022-01-01 DIAGNOSIS — I50.21 ACUTE SYSTOLIC CHF (CONGESTIVE HEART FAILURE): ICD-10-CM

## 2022-01-01 DIAGNOSIS — S22.068A OTHER CLOSED FRACTURE OF EIGHTH THORACIC VERTEBRA, INITIAL ENCOUNTER: ICD-10-CM

## 2022-01-01 DIAGNOSIS — S40.011A CONTUSION OF MULTIPLE SITES OF RIGHT SHOULDER, INITIAL ENCOUNTER: Primary | ICD-10-CM

## 2022-01-01 LAB
A PHAGOCYTOPH IGG TITR SER IF: NEGATIVE {TITER}
A PHAGOCYTOPH IGM TITR SER IF: NEGATIVE {TITER}
ALBUMIN SERPL-MCNC: 3.6 G/DL (ref 3.5–5.2)
ALBUMIN SERPL-MCNC: 3.7 G/DL (ref 3.5–4.6)
ALBUMIN/GLOB SERPL: 1.8 G/DL
ALBUMIN/GLOB SERPL: 1.9 {RATIO} (ref 1.2–2.2)
ALP SERPL-CCNC: 136 U/L (ref 39–117)
ALP SERPL-CCNC: 148 IU/L (ref 44–121)
ALT SERPL W P-5'-P-CCNC: 16 U/L (ref 1–41)
ALT SERPL-CCNC: 19 IU/L (ref 0–44)
ANION GAP SERPL CALCULATED.3IONS-SCNC: 9.5 MMOL/L (ref 5–15)
ASO AB SERPL-ACNC: <20 IU/ML (ref 0–200)
AST SERPL-CCNC: 27 IU/L (ref 0–40)
AST SERPL-CCNC: 27 U/L (ref 1–40)
B BURGDOR DNA SPEC QL NAA+PROBE: NEGATIVE
BASOPHILS # BLD AUTO: 0 X10E3/UL (ref 0–0.2)
BASOPHILS # BLD AUTO: 0.06 10*3/MM3 (ref 0–0.2)
BASOPHILS NFR BLD AUTO: 1 %
BASOPHILS NFR BLD AUTO: 1.5 % (ref 0–1.5)
BILIRUB SERPL-MCNC: 0.3 MG/DL (ref 0–1.2)
BILIRUB SERPL-MCNC: 0.4 MG/DL (ref 0–1.2)
BUN SERPL-MCNC: 25 MG/DL (ref 10–36)
BUN SERPL-MCNC: 30 MG/DL (ref 8–23)
BUN/CREAT SERPL: 20 (ref 10–24)
BUN/CREAT SERPL: 23.3 (ref 7–25)
CALCIUM SERPL-MCNC: 8.7 MG/DL (ref 8.6–10.2)
CALCIUM SPEC-SCNC: 8.4 MG/DL (ref 8.2–9.6)
CCP IGA+IGG SERPL IA-ACNC: 15 UNITS (ref 0–19)
CHLORIDE SERPL-SCNC: 106 MMOL/L (ref 98–107)
CHLORIDE SERPL-SCNC: 107 MMOL/L (ref 96–106)
CHOLEST SERPL-MCNC: 139 MG/DL (ref 100–199)
CO2 SERPL-SCNC: 23 MMOL/L (ref 20–29)
CO2 SERPL-SCNC: 24.5 MMOL/L (ref 22–29)
CREAT SERPL-MCNC: 1.28 MG/DL (ref 0.76–1.27)
CREAT SERPL-MCNC: 1.29 MG/DL (ref 0.76–1.27)
CRP SERPL-MCNC: 22 MG/L (ref 0–10)
DEPRECATED RDW RBC AUTO: 54.8 FL (ref 37–54)
E CHAFFEENSIS IGG TITR SER IF: NEGATIVE {TITER}
E CHAFFEENSIS IGM TITR SER IF: NEGATIVE {TITER}
EOSINOPHIL # BLD AUTO: 0.3 X10E3/UL (ref 0–0.4)
EOSINOPHIL # BLD AUTO: 0.38 10*3/MM3 (ref 0–0.4)
EOSINOPHIL NFR BLD AUTO: 8 %
EOSINOPHIL NFR BLD AUTO: 9.3 % (ref 0.3–6.2)
ERYTHROCYTE [DISTWIDTH] IN BLOOD BY AUTOMATED COUNT: 13.9 % (ref 11.6–15.4)
ERYTHROCYTE [DISTWIDTH] IN BLOOD BY AUTOMATED COUNT: 15.4 % (ref 12.3–15.4)
ERYTHROCYTE [SEDIMENTATION RATE] IN BLOOD BY WESTERGREN METHOD: 2 MM/HR (ref 0–30)
GFR SERPL CREATININE-BSD FRML MDRD: 52 ML/MIN/1.73
GLIADIN PEPTIDE IGA SER-ACNC: 3 UNITS (ref 0–19)
GLIADIN PEPTIDE IGG SER-ACNC: 2 UNITS (ref 0–19)
GLOBULIN SER CALC-MCNC: 2 G/DL (ref 1.5–4.5)
GLOBULIN UR ELPH-MCNC: 2 GM/DL
GLUCOSE SERPL-MCNC: 145 MG/DL (ref 65–99)
GLUCOSE SERPL-MCNC: 87 MG/DL (ref 65–99)
H PYLORI IGA SER-ACNC: <9 UNITS (ref 0–8.9)
H PYLORI IGG SER IA-ACNC: 0.43 INDEX VALUE (ref 0–0.79)
H PYLORI IGM SER-ACNC: <9 UNITS (ref 0–8.9)
HCT VFR BLD AUTO: 28.8 % (ref 37.5–51)
HCT VFR BLD AUTO: 31.1 % (ref 37.5–51)
HDLC SERPL-MCNC: 86 MG/DL
HGB BLD-MCNC: 10.4 G/DL (ref 13–17.7)
HGB BLD-MCNC: 9.2 G/DL (ref 13–17.7)
IMM GRANULOCYTES # BLD AUTO: 0 X10E3/UL (ref 0–0.1)
IMM GRANULOCYTES # BLD AUTO: 0.03 10*3/MM3 (ref 0–0.05)
IMM GRANULOCYTES NFR BLD AUTO: 0 %
IMM GRANULOCYTES NFR BLD AUTO: 0.7 % (ref 0–0.5)
IRON SATN MFR SERPL: 31 % (ref 15–55)
IRON SERPL-MCNC: 73 UG/DL (ref 38–169)
LDLC SERPL CALC-MCNC: 44 MG/DL (ref 0–99)
LYMPHOCYTES # BLD AUTO: 1.14 10*3/MM3 (ref 0.7–3.1)
LYMPHOCYTES # BLD AUTO: 1.2 X10E3/UL (ref 0.7–3.1)
LYMPHOCYTES NFR BLD AUTO: 27.9 % (ref 19.6–45.3)
LYMPHOCYTES NFR BLD AUTO: 28 %
MAGNESIUM SERPL-MCNC: 2.2 MG/DL (ref 1.6–2.3)
MCH RBC QN AUTO: 31.5 PG (ref 26.6–33)
MCH RBC QN AUTO: 31.5 PG (ref 26.6–33)
MCHC RBC AUTO-ENTMCNC: 31.9 G/DL (ref 31.5–35.7)
MCHC RBC AUTO-ENTMCNC: 33.4 G/DL (ref 31.5–35.7)
MCV RBC AUTO: 94 FL (ref 79–97)
MCV RBC AUTO: 98.6 FL (ref 79–97)
MONOCYTES # BLD AUTO: 0.35 10*3/MM3 (ref 0.1–0.9)
MONOCYTES # BLD AUTO: 0.4 X10E3/UL (ref 0.1–0.9)
MONOCYTES NFR BLD AUTO: 8.6 % (ref 5–12)
MONOCYTES NFR BLD AUTO: 9 %
NEUTROPHILS # BLD AUTO: 2.4 X10E3/UL (ref 1.4–7)
NEUTROPHILS NFR BLD AUTO: 2.12 10*3/MM3 (ref 1.7–7)
NEUTROPHILS NFR BLD AUTO: 52 % (ref 42.7–76)
NEUTROPHILS NFR BLD AUTO: 54 %
NRBC BLD AUTO-RTO: 0 /100 WBC (ref 0–0.2)
PLATELET # BLD AUTO: 126 10*3/MM3 (ref 140–450)
PLATELET # BLD AUTO: 128 X10E3/UL (ref 150–450)
PMV BLD AUTO: 9.7 FL (ref 6–12)
POTASSIUM SERPL-SCNC: 4.5 MMOL/L (ref 3.5–5.2)
POTASSIUM SERPL-SCNC: 4.6 MMOL/L (ref 3.5–5.2)
PROT SERPL-MCNC: 5.6 G/DL (ref 6–8.5)
PROT SERPL-MCNC: 5.7 G/DL (ref 6–8.5)
R RICKETTSI IGG SER QL IA: NEGATIVE
R RICKETTSI IGM SER-ACNC: 0.26 INDEX (ref 0–0.89)
RBC # BLD AUTO: 2.92 10*6/MM3 (ref 4.14–5.8)
RBC # BLD AUTO: 3.3 X10E6/UL (ref 4.14–5.8)
RETICS/RBC NFR AUTO: 1.2 % (ref 0.6–2.6)
RHEUMATOID FACT SERPL-ACNC: <10 IU/ML
SARS-COV-2 RNA PNL SPEC NAA+PROBE: NOT DETECTED
SODIUM SERPL-SCNC: 140 MMOL/L (ref 136–145)
SODIUM SERPL-SCNC: 143 MMOL/L (ref 134–144)
T4 FREE SERPL-MCNC: 1.62 NG/DL (ref 0.82–1.77)
TIBC SERPL-MCNC: 233 UG/DL (ref 250–450)
TRIGL SERPL-MCNC: 35 MG/DL (ref 0–149)
TSH SERPL DL<=0.005 MIU/L-ACNC: 3.2 UIU/ML (ref 0.45–4.5)
TTG IGA SER-ACNC: <2 U/ML (ref 0–3)
TTG IGG SER-ACNC: <2 U/ML (ref 0–5)
UIBC SERPL-MCNC: 160 UG/DL (ref 111–343)
URATE SERPL-MCNC: 6.2 MG/DL (ref 3.8–8.4)
VIT B12 SERPL-MCNC: 1428 PG/ML (ref 232–1245)
VIT B6 SERPL-MCNC: 25.7 UG/L (ref 5.3–46.7)
VLDLC SERPL CALC-MCNC: 9 MG/DL (ref 5–40)
WBC # BLD AUTO: 4.3 X10E3/UL (ref 3.4–10.8)
WBC NRBC COR # BLD: 4.08 10*3/MM3 (ref 3.4–10.8)

## 2022-01-01 PROCEDURE — 25010000002 IOPAMIDOL 61 % SOLUTION: Performed by: EMERGENCY MEDICINE

## 2022-01-01 PROCEDURE — 99285 EMERGENCY DEPT VISIT HI MDM: CPT

## 2022-01-01 PROCEDURE — 80053 COMPREHEN METABOLIC PANEL: CPT | Performed by: PHYSICIAN ASSISTANT

## 2022-01-01 PROCEDURE — 99284 EMERGENCY DEPT VISIT MOD MDM: CPT

## 2022-01-01 PROCEDURE — 74177 CT ABD & PELVIS W/CONTRAST: CPT

## 2022-01-01 PROCEDURE — 87635 SARS-COV-2 COVID-19 AMP PRB: CPT | Performed by: PHYSICIAN ASSISTANT

## 2022-01-01 PROCEDURE — 96376 TX/PRO/DX INJ SAME DRUG ADON: CPT

## 2022-01-01 PROCEDURE — 99283 EMERGENCY DEPT VISIT LOW MDM: CPT

## 2022-01-01 PROCEDURE — 73030 X-RAY EXAM OF SHOULDER: CPT

## 2022-01-01 PROCEDURE — 70498 CT ANGIOGRAPHY NECK: CPT

## 2022-01-01 PROCEDURE — 70496 CT ANGIOGRAPHY HEAD: CPT

## 2022-01-01 PROCEDURE — 72128 CT CHEST SPINE W/O DYE: CPT

## 2022-01-01 PROCEDURE — 99308 SBSQ NF CARE LOW MDM 20: CPT | Performed by: PHYSICIAN ASSISTANT

## 2022-01-01 PROCEDURE — 71275 CT ANGIOGRAPHY CHEST: CPT

## 2022-01-01 PROCEDURE — 72125 CT NECK SPINE W/O DYE: CPT

## 2022-01-01 PROCEDURE — 25010000002 FENTANYL CITRATE (PF) 50 MCG/ML SOLUTION: Performed by: EMERGENCY MEDICINE

## 2022-01-01 PROCEDURE — 99305 1ST NF CARE MODERATE MDM 35: CPT | Performed by: INTERNAL MEDICINE

## 2022-01-01 PROCEDURE — 0001A COVID-19 (PFIZER): CPT | Performed by: INTERNAL MEDICINE

## 2022-01-01 PROCEDURE — 91300 COVID-19 (PFIZER): CPT | Performed by: INTERNAL MEDICINE

## 2022-01-01 PROCEDURE — 96374 THER/PROPH/DIAG INJ IV PUSH: CPT

## 2022-01-01 PROCEDURE — 99204 OFFICE O/P NEW MOD 45 MIN: CPT | Performed by: INTERNAL MEDICINE

## 2022-01-01 PROCEDURE — 70450 CT HEAD/BRAIN W/O DYE: CPT

## 2022-01-01 PROCEDURE — 73130 X-RAY EXAM OF HAND: CPT

## 2022-01-01 PROCEDURE — 72131 CT LUMBAR SPINE W/O DYE: CPT

## 2022-01-01 PROCEDURE — 70486 CT MAXILLOFACIAL W/O DYE: CPT

## 2022-01-01 PROCEDURE — 93005 ELECTROCARDIOGRAM TRACING: CPT | Performed by: PHYSICIAN ASSISTANT

## 2022-01-01 PROCEDURE — 73060 X-RAY EXAM OF HUMERUS: CPT

## 2022-01-01 PROCEDURE — 85025 COMPLETE CBC W/AUTO DIFF WBC: CPT | Performed by: PHYSICIAN ASSISTANT

## 2022-01-01 PROCEDURE — 73080 X-RAY EXAM OF ELBOW: CPT

## 2022-01-01 RX ORDER — LOSARTAN POTASSIUM 25 MG/1
25 TABLET ORAL
COMMUNITY
Start: 2021-01-01 | End: 2022-01-01

## 2022-01-01 RX ORDER — FENTANYL CITRATE 50 UG/ML
50 INJECTION, SOLUTION INTRAMUSCULAR; INTRAVENOUS
Status: DISCONTINUED | OUTPATIENT
Start: 2022-01-01 | End: 2022-01-01 | Stop reason: HOSPADM

## 2022-01-01 RX ORDER — MORPHINE SULFATE 100 MG/5ML
5 SOLUTION ORAL
Qty: 30 ML | Refills: 0 | Status: SHIPPED | OUTPATIENT
Start: 2022-01-01

## 2022-01-01 RX ORDER — FERROUS SULFATE 325(65) MG
325 TABLET ORAL EVERY 12 HOURS
COMMUNITY

## 2022-01-01 RX ORDER — VENLAFAXINE HYDROCHLORIDE 37.5 MG/1
37.5 CAPSULE, EXTENDED RELEASE ORAL
COMMUNITY

## 2022-01-01 RX ORDER — FENTANYL CITRATE 50 UG/ML
25 INJECTION, SOLUTION INTRAMUSCULAR; INTRAVENOUS
Status: DISCONTINUED | OUTPATIENT
Start: 2022-01-01 | End: 2022-01-01 | Stop reason: HOSPADM

## 2022-01-01 RX ORDER — SENNA PLUS 8.6 MG/1
2 TABLET ORAL NIGHTLY
COMMUNITY

## 2022-01-01 RX ORDER — LIDOCAINE HYDROCHLORIDE AND EPINEPHRINE BITARTRATE 20; .01 MG/ML; MG/ML
10 INJECTION, SOLUTION SUBCUTANEOUS ONCE
Status: DISCONTINUED | OUTPATIENT
Start: 2022-01-01 | End: 2022-01-01 | Stop reason: HOSPADM

## 2022-01-01 RX ORDER — OMEPRAZOLE 20 MG/1
20 CAPSULE, DELAYED RELEASE ORAL DAILY
COMMUNITY
Start: 2022-01-01

## 2022-01-01 RX ORDER — LORAZEPAM 2 MG/ML
CONCENTRATE ORAL
Qty: 30 ML | Refills: 0 | Status: SHIPPED | OUTPATIENT
Start: 2022-01-01

## 2022-01-01 RX ORDER — TERIPARATIDE 250 UG/ML
INJECTION, SOLUTION SUBCUTANEOUS
COMMUNITY
Start: 2022-01-01

## 2022-01-01 RX ORDER — MULTIVITAMIN WITH IRON
250 TABLET ORAL
COMMUNITY

## 2022-01-01 RX ORDER — MELATONIN 200 MCG
6 TABLET ORAL NIGHTLY
COMMUNITY

## 2022-01-01 RX ORDER — LEVOTHYROXINE SODIUM 0.15 MG/1
150 TABLET ORAL
COMMUNITY
End: 2022-01-01 | Stop reason: SDUPTHER

## 2022-01-01 RX ORDER — METOPROLOL SUCCINATE 25 MG/1
TABLET, EXTENDED RELEASE ORAL
COMMUNITY
Start: 2021-01-01

## 2022-01-01 RX ORDER — MORPHINE SULFATE 100 MG/5ML
5 SOLUTION ORAL
Qty: 30 ML | Refills: 0 | Status: SHIPPED | OUTPATIENT
Start: 2022-01-01 | End: 2022-01-01 | Stop reason: SDUPTHER

## 2022-01-01 RX ORDER — DULOXETIN HYDROCHLORIDE 20 MG/1
CAPSULE, DELAYED RELEASE ORAL
COMMUNITY
Start: 2021-01-01

## 2022-01-01 RX ORDER — MORPHINE SULFATE 100 MG/5ML
SOLUTION ORAL
Qty: 30 ML | Refills: 0 | OUTPATIENT
Start: 2022-01-01

## 2022-01-01 RX ORDER — POTASSIUM CHLORIDE 750 MG/1
10 TABLET, EXTENDED RELEASE ORAL
COMMUNITY

## 2022-01-01 RX ORDER — LORAZEPAM 0.5 MG/1
0.5 TABLET ORAL EVERY 6 HOURS PRN
COMMUNITY

## 2022-01-01 RX ORDER — TAMSULOSIN HYDROCHLORIDE 0.4 MG/1
CAPSULE ORAL
COMMUNITY
Start: 2021-01-01

## 2022-01-01 RX ORDER — CYANOCOBALAMIN (VITAMIN B-12) 500 MCG
1 LOZENGE ORAL
COMMUNITY

## 2022-01-01 RX ORDER — ACETAMINOPHEN AND CODEINE PHOSPHATE 300; 30 MG/1; MG/1
1 TABLET ORAL ONCE
Status: COMPLETED | OUTPATIENT
Start: 2022-01-01 | End: 2022-01-01

## 2022-01-01 RX ORDER — ACETAMINOPHEN 500 MG
500 TABLET ORAL
COMMUNITY

## 2022-01-01 RX ORDER — ASPIRIN 81 MG/1
TABLET, CHEWABLE ORAL
COMMUNITY

## 2022-01-01 RX ORDER — LEVOTHYROXINE SODIUM 0.15 MG/1
150 TABLET ORAL DAILY
Qty: 90 TABLET | Refills: 3 | Status: SHIPPED | OUTPATIENT
Start: 2022-01-01

## 2022-01-01 RX ORDER — B-COMPLEX WITH VITAMIN C
1 TABLET ORAL
COMMUNITY

## 2022-01-01 RX ORDER — AMIODARONE HYDROCHLORIDE 200 MG/1
200 TABLET ORAL
COMMUNITY
Start: 2021-01-01

## 2022-01-01 RX ORDER — MULTIPLE VITAMINS W/ MINERALS TAB 9MG-400MCG
1 TAB ORAL DAILY
COMMUNITY

## 2022-01-01 RX ORDER — PHENYTOIN SODIUM 100 MG/1
100 CAPSULE, EXTENDED RELEASE ORAL 3 TIMES DAILY
COMMUNITY
Start: 2021-01-01

## 2022-01-01 RX ORDER — LORAZEPAM 2 MG/ML
0.5 CONCENTRATE ORAL EVERY 8 HOURS PRN
Qty: 30 ML | Refills: 0 | Status: SHIPPED | OUTPATIENT
Start: 2022-01-01 | End: 2022-01-01 | Stop reason: SDUPTHER

## 2022-01-01 RX ORDER — FUROSEMIDE 20 MG/1
TABLET ORAL
COMMUNITY

## 2022-01-01 RX ORDER — ASCORBIC ACID 500 MG
500 TABLET ORAL DAILY
COMMUNITY

## 2022-01-01 RX ADMIN — ACETAMINOPHEN AND CODEINE PHOSPHATE 1 TABLET: 300; 30 TABLET ORAL at 19:05

## 2022-01-01 RX ADMIN — FENTANYL CITRATE 50 MCG: 50 INJECTION INTRAMUSCULAR; INTRAVENOUS at 20:55

## 2022-01-01 RX ADMIN — FENTANYL CITRATE 50 MCG: 50 INJECTION INTRAMUSCULAR; INTRAVENOUS at 19:57

## 2022-01-01 RX ADMIN — FENTANYL CITRATE 25 MCG: 50 INJECTION INTRAMUSCULAR; INTRAVENOUS at 18:43

## 2022-01-01 RX ADMIN — IOPAMIDOL 100 ML: 612 INJECTION, SOLUTION INTRAVENOUS at 19:13

## 2022-01-07 NOTE — TELEPHONE ENCOUNTER
Called patient with no answer, left a vm seeing if patient wanted to switch appointment to video visit due to inclement weather or if patient wanted to reschedule his appointment or keep it for today. Dr. Sales wanted to reach out to any elderly patients or patient's traveling long distances to try and get them moved up or rescheduled.

## 2022-01-24 PROBLEM — K44.9 HIATAL HERNIA: Status: ACTIVE | Noted: 2021-01-01

## 2022-01-24 PROBLEM — I42.9 CARDIOMYOPATHY (HCC): Status: ACTIVE | Noted: 2022-01-01

## 2022-01-24 PROBLEM — E04.2 MULTINODULAR GOITER: Status: ACTIVE | Noted: 2022-01-01

## 2022-01-24 PROBLEM — M66.349 NONTRAUMATIC RUPTURE OF FLEXOR TENDONS OF HAND AND WRIST: Status: ACTIVE | Noted: 2018-05-28

## 2022-01-24 PROBLEM — D32.9 MENINGIOMA (HCC): Status: ACTIVE | Noted: 2022-01-01

## 2022-01-24 PROBLEM — J42 CHRONIC BRONCHITIS (HCC): Status: ACTIVE | Noted: 2022-01-01

## 2022-01-24 PROBLEM — G31.9 CEREBRAL ATROPHY (HCC): Status: ACTIVE | Noted: 2022-01-01

## 2022-01-24 PROBLEM — G56.01 CARPAL TUNNEL SYNDROME OF RIGHT WRIST: Status: ACTIVE | Noted: 2018-01-22
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
## 2022-01-24 PROBLEM — E03.9 HYPOTHYROIDISM: Status: ACTIVE | Noted: 2020-01-01

## 2022-01-24 PROBLEM — R91.8 OPACITY OF LUNG ON IMAGING STUDY: Status: ACTIVE | Noted: 2021-01-01

## 2022-01-24 PROBLEM — N18.32 STAGE 3B CHRONIC KIDNEY DISEASE (HCC): Status: ACTIVE | Noted: 2017-12-07

## 2022-01-24 PROBLEM — K22.70 BARRETT ESOPHAGUS: Status: ACTIVE | Noted: 2022-01-01

## 2022-01-24 PROBLEM — E55.9 HYPOVITAMINOSIS D: Status: ACTIVE | Noted: 2022-01-01

## 2022-01-24 PROBLEM — E66.9 OBESITY (BMI 30.0-34.9): Status: ACTIVE | Noted: 2022-01-01

## 2022-01-24 PROBLEM — N28.1 RENAL CYST: Status: ACTIVE | Noted: 2022-01-01

## 2022-01-24 PROBLEM — R60.0 LOCALIZED EDEMA: Status: ACTIVE | Noted: 2018-01-22

## 2022-01-24 PROBLEM — E78.2 MIXED HYPERLIPIDEMIA: Status: ACTIVE | Noted: 2022-01-01

## 2022-01-24 PROBLEM — S06.5XAA SUBDURAL HEMATOMA: Status: ACTIVE | Noted: 2021-01-01

## 2022-01-24 PROBLEM — I65.29 CAROTID STENOSIS: Status: ACTIVE | Noted: 2022-01-01

## 2022-01-24 PROBLEM — N25.81 SECONDARY HYPERPARATHYROIDISM: Status: ACTIVE | Noted: 2022-01-01

## 2022-01-24 PROBLEM — M79.18 MYOFASCIAL PAIN SYNDROME: Status: ACTIVE | Noted: 2017-11-22

## 2022-01-24 PROBLEM — E20.9 HYPOPARATHYROIDISM: Status: ACTIVE | Noted: 2022-01-01

## 2022-01-24 PROBLEM — N40.1 BPH WITH OBSTRUCTION/LOWER URINARY TRACT SYMPTOMS: Status: ACTIVE | Noted: 2017-12-07

## 2022-01-24 PROBLEM — I47.20 VENTRICULAR TACHYCARDIA (HCC): Status: ACTIVE | Noted: 2019-11-10

## 2022-01-24 PROBLEM — S66.811S: Status: ACTIVE | Noted: 2018-05-28

## 2022-01-24 PROBLEM — E83.51 HYPOCALCEMIA: Status: ACTIVE | Noted: 2018-01-22

## 2022-01-24 PROBLEM — M81.0 OSTEOPOROSIS: Status: ACTIVE | Noted: 2022-01-01

## 2022-01-24 PROBLEM — M51.9 SCHMORL'S NODE: Status: ACTIVE | Noted: 2021-01-01

## 2022-01-24 PROBLEM — M06.9 RA (RHEUMATOID ARTHRITIS): Status: ACTIVE | Noted: 2022-01-01

## 2022-01-24 PROBLEM — Z23 NEED FOR COVID-19 VACCINE: Status: ACTIVE | Noted: 2022-01-01

## 2022-01-24 PROBLEM — S22.000A COMPRESSION FRACTURE OF BODY OF THORACIC VERTEBRA: Status: ACTIVE | Noted: 2021-04-03

## 2022-01-24 PROBLEM — M75.01 ADHESIVE CAPSULITIS OF BOTH SHOULDERS: Status: ACTIVE | Noted: 2022-01-01

## 2022-01-24 PROBLEM — M54.50 LOW BACK PAIN: Status: ACTIVE | Noted: 2022-01-01

## 2022-01-24 PROBLEM — I48.91 ATRIAL FIBRILLATION (HCC): Status: ACTIVE | Noted: 2021-01-01

## 2022-01-24 PROBLEM — M19.90 ARTHRITIS: Status: ACTIVE | Noted: 2022-01-01

## 2022-01-24 PROBLEM — D50.9 IRON DEFICIENCY ANEMIA: Status: ACTIVE | Noted: 2022-01-01

## 2022-01-24 PROBLEM — M66.339 NONTRAUMATIC RUPTURE OF FLEXOR TENDONS OF HAND AND WRIST: Status: ACTIVE | Noted: 2018-05-28

## 2022-01-24 PROBLEM — D69.6 THROMBOCYTOPENIA (HCC): Status: ACTIVE | Noted: 2022-01-01

## 2022-01-24 PROBLEM — M79.672 FOOT PAIN, LEFT: Status: ACTIVE | Noted: 2022-01-01

## 2022-01-24 PROBLEM — I10 ESSENTIAL HYPERTENSION: Status: ACTIVE | Noted: 2017-12-07

## 2022-01-24 PROBLEM — D64.9 ANEMIA: Status: ACTIVE | Noted: 2020-05-15

## 2022-01-24 PROBLEM — L03.90 CELLULITIS: Status: ACTIVE | Noted: 2019-05-05

## 2022-01-24 PROBLEM — I50.21 ACUTE SYSTOLIC CHF (CONGESTIVE HEART FAILURE): Status: ACTIVE | Noted: 2022-01-01

## 2022-01-24 PROBLEM — S22.49XA RIB FRACTURES: Status: ACTIVE | Noted: 2021-01-01

## 2022-01-24 PROBLEM — M75.02 ADHESIVE CAPSULITIS OF BOTH SHOULDERS: Status: ACTIVE | Noted: 2022-01-01

## 2022-01-24 PROBLEM — G47.33 OBSTRUCTIVE SLEEP APNEA: Status: ACTIVE | Noted: 2022-01-01

## 2022-01-24 PROBLEM — M41.9 SCOLIOSIS: Status: ACTIVE | Noted: 2022-01-01

## 2022-01-24 PROBLEM — G62.9 PERIPHERAL NERVE DISEASE: Status: ACTIVE | Noted: 2022-01-01

## 2022-01-24 PROBLEM — I49.5 SSS (SICK SINUS SYNDROME): Status: ACTIVE | Noted: 2017-07-24

## 2022-01-24 PROBLEM — M96.1 LUMBAR POST-LAMINECTOMY SYNDROME: Status: ACTIVE | Noted: 2017-06-28

## 2022-01-24 PROBLEM — I35.0 AORTIC STENOSIS: Status: ACTIVE | Noted: 2022-01-01

## 2022-01-24 PROBLEM — N13.8 BPH WITH OBSTRUCTION/LOWER URINARY TRACT SYMPTOMS: Status: ACTIVE | Noted: 2017-12-07

## 2022-01-24 PROBLEM — I87.309 VENOUS HYPERTENSION OF LOWER EXTREMITY: Status: ACTIVE | Noted: 2019-06-10

## 2022-01-24 PROBLEM — J45.909 ASTHMA: Status: ACTIVE | Noted: 2022-01-01

## 2022-01-24 NOTE — PROGRESS NOTES
Subjective     Patient ID: Devan Yu is a 89 y.o. male. Patient is here for management of multiple medical problems.     Chief Complaint   Patient presents with   • Shoulder Pain     bilateral shoulders   • Itching     bilateral sides   • Toe Pain     left foot     History of Present Illness     Moved form [place]  PCP [facility].     On amiodarone.   Was to get AICD.  Was placed on amiodarone.      The following portions of the patient's history were reviewed and updated as appropriate: allergies, current medications, past family history, past medical history, past social history, past surgical history and problem list.    Review of Systems    Current Outpatient Medications:   •  Alpha-Lipoic Acid 100 MG capsule, Take 1 capsule by mouth Daily., Disp: , Rfl:   •  amiodarone (PACERONE) 200 MG tablet, Take 200 mg by mouth., Disp: , Rfl:   •  ascorbic acid (VITAMIN C) 500 MG tablet, Take 500 mg by mouth Daily., Disp: , Rfl:   •  aspirin (Aspirin 81) 81 MG chewable tablet, Aspir-81, Disp: , Rfl:   •  B Complex Vitamins (Vitamin B Complex) tablet, Take 1 tablet by mouth., Disp: , Rfl:   •  Cholecalciferol 50 MCG (2000 UT) tablet, Vitamin D3 50 mcg (2,000 unit) tablet, Disp: , Rfl:   •  DULoxetine (CYMBALTA) 20 MG capsule, , Disp: , Rfl:   •  ferrous sulfate 325 (65 FE) MG tablet, 325 mg Every 12 (Twelve) Hours., Disp: , Rfl:   •  furosemide (LASIX) 20 MG tablet, furosemide 20 mg tablet, Disp: , Rfl:   •  levothyroxine (Synthroid) 150 MCG tablet, Take 1 tablet by mouth Daily., Disp: 90 tablet, Rfl: 3  •  Magnesium 250 MG tablet, Take 250 mg by mouth., Disp: , Rfl:   •  metoprolol succinate XL (TOPROL-XL) 25 MG 24 hr tablet, , Disp: , Rfl:   •  multivitamin with minerals (AZRA BASIC PO), Take 1 tablet by mouth Daily., Disp: , Rfl:   •  Nutritional Supplements (Silica) 12.5 MG capsule, Take 1 capsule by mouth Daily., Disp: , Rfl:   •  omeprazole (priLOSEC) 20 MG capsule, , Disp: , Rfl:   •  phenytoin ER (DILANTIN) 100 MG  "capsule, , Disp: , Rfl:   •  potassium chloride (K-DUR,KLOR-CON) 10 MEQ CR tablet, Take 10 mEq by mouth., Disp: , Rfl:   •  Potassium Gluconate 2.5 MEQ tablet, Take 1 tablet by mouth Daily., Disp: , Rfl:   •  tamsulosin (FLOMAX) 0.4 MG capsule 24 hr capsule, , Disp: , Rfl:   •  Teriparatide, Recombinant, (FORTEO) 620 MCG/2.48ML injection, , Disp: , Rfl:   •  Vitamin E 400 units tablet, 1 tablet., Disp: , Rfl:   •  lidocaine (XYLOCAINE) 2 % jelly, Apply  topically to the appropriate area as directed As Needed for Mild Pain ., Disp: 85 g, Rfl: 5    Objective      Blood pressure 102/60, pulse 74, temperature 98.4 °F (36.9 °C), resp. rate 16, height 170.2 cm (67\"), weight 83 kg (183 lb), SpO2 100 %.    Physical Exam     General Appearance:    Alert, cooperative, no distress, appears stated age   Head:    Normocephalic, without obvious abnormality, atraumatic   Eyes:    PERRL, conjunctiva/corneas clear, EOM's intact   Ears:    Normal TM's and external ear canals, both ears   Nose:   Nares normal, septum midline, mucosa normal, no drainage   or sinus tenderness   Throat:   Lips, mucosa, and tongue normal; teeth and gums normal   Neck:   Supple, symmetrical, trachea midline, no adenopathy;        thyroid:  No enlargement/tenderness/nodules; no carotid    bruit or JVD   Back:     Symmetric, no curvature, ROM normal, no CVA tenderness   Lungs:     Clear to auscultation bilaterally, respirations unlabored   Chest wall:    No tenderness or deformity   Heart:    Regular rate and rhythm, S1 and S2 normal, 3/6 murmur,        rub or gallop   Abdomen:     Soft, non-tender, bowel sounds active all four quadrants,     no masses, no organomegaly   Extremities:   Extremities normal, atraumatic, no cyanosis,+3 edema   Pulses:   2+ and symmetric all extremities   Skin:   Skin color, texture, turgor normal, no rashes or lesions   Lymph nodes:   Cervical, supraclavicular, and axillary nodes normal   Neurologic:   CNII-XII intact. Normal " strength, sensation and reflexes       throughout      Results for orders placed or performed during the hospital encounter of 10/24/21   CBC Auto Differential    Specimen: Blood   Result Value Ref Range    WBC 5.16 3.40 - 10.80 10*3/mm3    RBC 2.98 (L) 4.14 - 5.80 10*6/mm3    Hemoglobin 9.6 (L) 13.0 - 17.7 g/dL    Hematocrit 29.9 (L) 37.5 - 51.0 %    .3 (H) 79.0 - 97.0 fL    MCH 32.2 26.6 - 33.0 pg    MCHC 32.1 31.5 - 35.7 g/dL    RDW 17.2 (H) 12.3 - 15.4 %    RDW-SD 62.9 (H) 37.0 - 54.0 fl    MPV 9.4 6.0 - 12.0 fL    Platelets 107 (L) 140 - 450 10*3/mm3    Neutrophil % 54.1 42.7 - 76.0 %    Lymphocyte % 26.6 19.6 - 45.3 %    Monocyte % 10.9 5.0 - 12.0 %    Eosinophil % 7.4 (H) 0.3 - 6.2 %    Basophil % 0.6 0.0 - 1.5 %    Immature Grans % 0.4 0.0 - 0.5 %    Neutrophils, Absolute 2.80 1.70 - 7.00 10*3/mm3    Lymphocytes, Absolute 1.37 0.70 - 3.10 10*3/mm3    Monocytes, Absolute 0.56 0.10 - 0.90 10*3/mm3    Eosinophils, Absolute 0.38 0.00 - 0.40 10*3/mm3    Basophils, Absolute 0.03 0.00 - 0.20 10*3/mm3    Immature Grans, Absolute 0.02 0.00 - 0.05 10*3/mm3    nRBC 0.0 0.0 - 0.2 /100 WBC   Comprehensive Metabolic Panel    Specimen: Blood   Result Value Ref Range    Glucose 106 (H) 65 - 99 mg/dL    BUN 27 (H) 8 - 23 mg/dL    Creatinine 1.41 (H) 0.76 - 1.27 mg/dL    Sodium 142 136 - 145 mmol/L    Potassium 4.7 3.5 - 5.2 mmol/L    Chloride 110 (H) 98 - 107 mmol/L    CO2 25.3 22.0 - 29.0 mmol/L    Calcium 8.3 8.2 - 9.6 mg/dL    Total Protein 5.6 (L) 6.0 - 8.5 g/dL    Albumin 3.30 (L) 3.50 - 5.20 g/dL    ALT (SGPT) 16 1 - 41 U/L    AST (SGOT) 22 1 - 40 U/L    Alkaline Phosphatase 107 39 - 117 U/L    Total Bilirubin 0.2 0.0 - 1.2 mg/dL    eGFR Non African Amer 47 (L) >60 mL/min/1.73    Globulin 2.3 gm/dL    A/G Ratio 1.4 g/dL    BUN/Creatinine Ratio 19.1 7.0 - 25.0    Anion Gap 6.7 5.0 - 15.0 mmol/L         Assessment/Plan       Diagnoses and all orders for this visit:    1. Need for COVID-19 vaccine (Primary)  -      COVID-19 Vaccine (Pfizer) Purple Cap  -     Magnesium  -     Iron Profile  -     Vitamin B6  -     Lipid Panel  -     CBC & Differential  -     Vitamin B12  -     Comprehensive Metabolic Panel  -     TSH  -     T4, Free  -     Glia(IgA / G) & TTG(IgA / G)  -     Helicobacter Pylori, IgA IgG IgM  -     Uric Acid  -     Sedimentation Rate  -     Rheumatoid Factor  -     Cyclic Citrul Peptide Antibody, IgG / IgA  -     [place] Antibody Panel  -     Lyme Disease, PCR - , Arm, Right  -     [place] Spotted Fever, IgG  -     [place] Spotted Fever, IgM  -     C-reactive Protein  -     Antistreptolysin O Titer  -     Ambulatory Referral to Physical Therapy    2. PORFIRIO treated with BiPAP  -     Magnesium  -     Iron Profile  -     Vitamin B6  -     Lipid Panel  -     CBC & Differential  -     Vitamin B12  -     Comprehensive Metabolic Panel  -     TSH  -     T4, Free  -     Glia(IgA / G) & TTG(IgA / G)  -     Helicobacter Pylori, IgA IgG IgM  -     Uric Acid  -     Sedimentation Rate  -     Rheumatoid Factor  -     Cyclic Citrul Peptide Antibody, IgG / IgA  -     [place] Antibody Panel  -     Lyme Disease, PCR - , Arm, Right  -     [place] Spotted Fever, IgG  -     [place] Spotted Fever, IgM  -     C-reactive Protein  -     Antistreptolysin O Titer    3. Iron deficiency anemia, unspecified iron deficiency anemia type  -     Magnesium  -     Iron Profile  -     Vitamin B6  -     Lipid Panel  -     CBC & Differential  -     Vitamin B12  -     Comprehensive Metabolic Panel  -     TSH  -     T4, Free  -     Glia(IgA / G) & TTG(IgA / G)  -     Helicobacter Pylori, IgA IgG IgM  -     Uric Acid  -     Sedimentation Rate  -     Rheumatoid Factor  -     Cyclic Citrul Peptide Antibody, IgG / IgA  -     [place] Antibody Panel  -     Lyme Disease, PCR - , Arm, Right  -     [place] Spotted Fever, IgG  -     [place] Spotted Fever, IgM  -     C-reactive Protein  -     Antistreptolysin O Titer  -      Reticulocytes    4. Acquired hypothyroidism  -     Magnesium  -     Iron Profile  -     Vitamin B6  -     Lipid Panel  -     CBC & Differential  -     Vitamin B12  -     Comprehensive Metabolic Panel  -     TSH  -     T4, Free  -     Glia(IgA / G) & TTG(IgA / G)  -     Helicobacter Pylori, IgA IgG IgM  -     Uric Acid  -     Sedimentation Rate  -     Rheumatoid Factor  -     Cyclic Citrul Peptide Antibody, IgG / IgA  -     [place] Antibody Panel  -     Lyme Disease, PCR - , Arm, Right  -     [place] Spotted Fever, IgG  -     [place] Spotted Fever, IgM  -     C-reactive Protein  -     Antistreptolysin O Titer    5. Arthritis  -     Magnesium  -     Iron Profile  -     Vitamin B6  -     Lipid Panel  -     CBC & Differential  -     Vitamin B12  -     Comprehensive Metabolic Panel  -     TSH  -     T4, Free  -     Glia(IgA / G) & TTG(IgA / G)  -     Helicobacter Pylori, IgA IgG IgM  -     Uric Acid  -     Sedimentation Rate  -     Rheumatoid Factor  -     Cyclic Citrul Peptide Antibody, IgG / IgA  -     [place] Antibody Panel  -     Lyme Disease, PCR - , Arm, Right  -     [place] Spotted Fever, IgG  -     [place] Spotted Fever, IgM  -     C-reactive Protein  -     Antistreptolysin O Titer    6. Adhesive capsulitis of both shoulders  -     Magnesium  -     Iron Profile  -     Vitamin B6  -     Lipid Panel  -     CBC & Differential  -     Vitamin B12  -     Comprehensive Metabolic Panel  -     TSH  -     T4, Free  -     Glia(IgA / G) & TTG(IgA / G)  -     Helicobacter Pylori, IgA IgG IgM  -     Uric Acid  -     Sedimentation Rate  -     Rheumatoid Factor  -     Cyclic Citrul Peptide Antibody, IgG / IgA  -     [place] Antibody Panel  -     Lyme Disease, PCR - , Arm, Right  -     [place] Spotted Fever, IgG  -     [place] Spotted Fever, IgM  -     C-reactive Protein  -     Antistreptolysin O Titer    7. Foot pain, left    Other orders  -     levothyroxine (Synthroid) 150 MCG tablet; Take 1  tablet by mouth Daily.  Dispense: 90 tablet; Refill: 3  -     lidocaine (XYLOCAINE) 2 % jelly; Apply  topically to the appropriate area as directed As Needed for Mild Pain .  Dispense: 85 g; Refill: 5      No follow-ups on file.          There are no Patient Instructions on file for this visit.     Sundeep Sales MD    Assessment/Plan

## 2022-01-26 NOTE — PROGRESS NOTES
Please let them know the uric acid is elevated. Renal function a bit better.   Avoid high fructose corn syrup.   May need to try med for the high uric acid but is will be tricky.

## 2022-02-12 NOTE — ED PROVIDER NOTES
Subjective   89-year-old male presents to the ED with a chief complaint of fall.  Patient states that he fell landing on his right shoulder and right hand.  He has a skin tear to his right hand.  He has pain in his bilateral shoulders, pain is worse is in his right shoulder.  Pain is a severe dull ache in his right shoulder.  Denied his head.  No loss conscious.  No neck or back pain.  No prior treatments limiting factors.  No prior evaluations.  No other complaints at this time.          Review of Systems   Musculoskeletal: Positive for arthralgias and joint swelling.   All other systems reviewed and are negative.      Past Medical History:   Diagnosis Date   • Anemia    • Arthritis    • Asthma    • CHF (congestive heart failure) ([facility])    • Epilepsy ([facility])    • Falls frequently    • GERD (gastroesophageal reflux disease)    • H/O blood clots    • Heart failure ([facility])    • Osteoporosis    • Pacemaker    • Rheumatic fever    • Sleep apnea    • Thyroid disease        No Known Allergies    Past Surgical History:   Procedure Laterality Date   • BACK SURGERY     • HAND SURGERY Right    • JOINT REPLACEMENT     • PACEMAKER IMPLANTATION     • REPLACEMENT TOTAL KNEE Bilateral    • TOE AMPUTATION Left        Family History   Problem Relation Age of Onset   • Heart attack Brother        Social History     Socioeconomic History   • Marital status:    Tobacco Use   • Smoking status: Never Smoker   • Smokeless tobacco: Never Used   Vaping Use   • Vaping Use: Never used   Substance and Sexual Activity   • Alcohol use: Never   • Drug use: Never   • Sexual activity: Not Currently           Objective   Physical Exam  Vitals and nursing note reviewed.   Constitutional:       General: He is not in acute distress.     Appearance: He is well-developed. He is not diaphoretic.      Comments: Elderly-appearing   HENT:      Head: Normocephalic and atraumatic.      Nose: Nose normal.   Eyes:      Conjunctiva/sclera: Conjunctivae normal.       Pupils: Pupils are equal, round, and reactive to light.   Cardiovascular:      Rate and Rhythm: Normal rate and regular rhythm.   Pulmonary:      Effort: Pulmonary effort is normal. No respiratory distress.      Breath sounds: Normal breath sounds.   Abdominal:      General: There is no distension.      Palpations: Abdomen is soft.      Tenderness: There is no abdominal tenderness.   Musculoskeletal:         General: No deformity.      Comments: Tenderness to palpation to the anterior aspect of the bilateral shoulders.  No significant ecchymosis or edema.  Tenderness to palpation ecchymosis to the right hand base of the second and third knuckles with a skin tear overlying the right hand   Neurological:      Mental Status: He is alert and oriented to person, place, and time.      Cranial Nerves: No cranial nerve deficit.      Coordination: Coordination normal.         Procedures           ED Course                                                 MDM  Patient presented to the ED status post fall.  Imaging is negative for acute traumatic process.  X-ray does appear to have old fractures and calcifications.  Given a dose of pain meds in the ED.  Skin tear on his right hand unamenable to sutures secondary to thin skin.  Nonstick dressing and Steri-Strips were used.  Proper for discharge follow-up outpatient as needed.      Final diagnoses:   Contusion of multiple sites of right shoulder, initial encounter   Skin tear of right hand without complication, initial encounter       ED Disposition  ED Disposition     ED Disposition Condition Comment    Discharge Stable           Sundeep aSles MD  [address] [place], [place] [id number] [phone]               Medication List      No changes were made to your prescriptions during this visit.          Selvin Hernandez, DO  02/12/22 0001

## 2022-02-13 NOTE — ED PROVIDER NOTES
Subjective   History of Present Illness    Review of Systems    Past Medical History:   Diagnosis Date   • Anemia    • Arthritis    • Asthma    • CHF (congestive heart failure) (HCC)    • Epilepsy (HCC)    • Falls frequently    • GERD (gastroesophageal reflux disease)    • H/O blood clots    • Heart failure (HCC)    • Osteoporosis    • Pacemaker    • Rheumatic fever    • Sleep apnea    • Thyroid disease        No Known Allergies    Past Surgical History:   Procedure Laterality Date   • BACK SURGERY     • HAND SURGERY Right    • JOINT REPLACEMENT     • PACEMAKER IMPLANTATION     • REPLACEMENT TOTAL KNEE Bilateral    • TOE AMPUTATION Left        Family History   Problem Relation Age of Onset   • Heart attack Brother        Social History     Socioeconomic History   • Marital status:    Tobacco Use   • Smoking status: Never Smoker   • Smokeless tobacco: Never Used   Vaping Use   • Vaping Use: Never used   Substance and Sexual Activity   • Alcohol use: Never   • Drug use: Never   • Sexual activity: Not Currently           Objective   Physical Exam    Procedures           ED Course  ED Course as of 02/13/22 0454   Sat Feb 12, 2022 2207 Assumed care at shift change patient with CT head and cervical spine pending both are negative will discharge home with outpatient follow-up. [MH]      ED Course User Index  [MH] Salma Khan DO                                                 MDM  Number of Diagnoses or Management Options  Contusion of multiple sites of right shoulder, initial encounter: new and requires workup  Hematoma of scalp, initial encounter: new and requires workup  Skin tear of right hand without complication, initial encounter: new and requires workup     Amount and/or Complexity of Data Reviewed  Clinical lab tests: ordered and reviewed  Tests in the radiology section of CPT®: ordered and reviewed  Tests in the medicine section of CPT®: reviewed and ordered        Final diagnoses:    Contusion of multiple sites of right shoulder, initial encounter   Skin tear of right hand without complication, initial encounter   Hematoma of scalp, initial encounter       ED Disposition  ED Disposition     ED Disposition Condition Comment    Discharge Stable           Sundeep Sales MD  [address] [place] [address] [phone]               Medication List      No changes were made to your prescriptions during this visit.          Salma Khan,   02/13/22 0454       Salma Khan,   02/13/22 3307

## 2022-02-13 NOTE — ED NOTES
Pt received discharge instructions and verbalized understanding; Breathing even and nonlabored with no signs of distress; AOx4; GCS 15; Pt ambulated off unit with steady gait.      Zulma Anderson RN  02/12/22 4791    
standing/walking/toileting

## 2022-02-15 NOTE — ED PROVIDER NOTES
Subjective   89 yo male with history of arthritis, anemia, congestive heart failure, obstructive sleep apnea, osteoporosis he presents the emergency department after suffering a fall.      History provided by:  Patient and EMS personnel  Fall  Mechanism of injury: fall    Injury location:  Shoulder/arm  Shoulder/arm injury location:  L arm, L upper arm and L elbow  Incident location:  Home  Arrived directly from scene: yes    Fall:     Fall occurred:  Walking    Impact surface:  Hard floor    Entrapped after fall: no    Protective equipment: none    Suspicion of alcohol use: no    Suspicion of drug use: no    Tetanus status:  Up to date  Prior to arrival data:     Bystander interventions:  None    Patient ambulatory at scene: yes      Blood loss:  Minimal    Orientation at scene:  Person, place, situation and time    Loss of consciousness: no      Amnesic to event: no      Airway interventions:  None    Breathing interventions:  None    IV access status:  None    IO access:  None    Fluids administered:  None    Cardiac interventions:  None    Medications administered:  None    Immobilization:  None    Airway condition since incident:  Stable    Breathing condition since incident:  Stable    Circulation condition since incident:  Stable    Mental status condition since incident:  Stable    Disability condition since incident:  Stable  Associated symptoms: no abdominal pain and no chest pain        Review of Systems   Constitutional: Negative.  Negative for fever.   HENT: Negative.    Respiratory: Negative.    Cardiovascular: Negative.  Negative for chest pain.   Gastrointestinal: Negative.  Negative for abdominal pain.   Endocrine: Negative.    Genitourinary: Negative.  Negative for dysuria.   Skin: Negative.    Neurological: Negative.    Psychiatric/Behavioral: Negative.    All other systems reviewed and are negative.      Past Medical History:   Diagnosis Date   • Anemia    • Arthritis    • Asthma    • CHF  (congestive heart failure) (HCC)    • Epilepsy (HCC)    • Falls frequently    • GERD (gastroesophageal reflux disease)    • H/O blood clots    • Heart failure (HCC)    • Osteoporosis    • Pacemaker    • Rheumatic fever    • Sleep apnea    • Thyroid disease        No Known Allergies    Past Surgical History:   Procedure Laterality Date   • BACK SURGERY     • HAND SURGERY Right    • JOINT REPLACEMENT     • PACEMAKER IMPLANTATION     • REPLACEMENT TOTAL KNEE Bilateral    • TOE AMPUTATION Left        Family History   Problem Relation Age of Onset   • Heart attack Brother        Social History     Socioeconomic History   • Marital status:    Tobacco Use   • Smoking status: Never Smoker   • Smokeless tobacco: Never Used   Vaping Use   • Vaping Use: Never used   Substance and Sexual Activity   • Alcohol use: Never   • Drug use: Never   • Sexual activity: Not Currently           Objective   Physical Exam  Vitals and nursing note reviewed.   Constitutional:       Appearance: Normal appearance.   HENT:      Head: Normocephalic and atraumatic.      Right Ear: Tympanic membrane normal.      Nose: Nose normal.      Mouth/Throat:      Mouth: Mucous membranes are moist.   Eyes:      Pupils: Pupils are equal, round, and reactive to light.   Cardiovascular:      Rate and Rhythm: Normal rate and regular rhythm.      Pulses: Normal pulses.   Abdominal:      General: Abdomen is flat.   Musculoskeletal:         General: Tenderness present.      Cervical back: Normal range of motion.   Skin:     Capillary Refill: Capillary refill takes less than 2 seconds.      Findings: Bruising present.      Comments: Multiple bruises on his upper extremities as well as lower extremities there are bruises that are in various stages of healing on his left side from previous fall.  He also had a skin tear on his left arm that has been dressed.   Neurological:      General: No focal deficit present.      Mental Status: He is alert.   Psychiatric:          Mood and Affect: Mood normal.         Behavior: Behavior normal.         Thought Content: Thought content normal.         Judgment: Judgment normal.         Procedures           ED Course                                                 MDM  Number of Diagnoses or Management Options  Recurrent falls: new and requires workup  Skin tear of left upper arm without complication, initial encounter: new and requires workup     Amount and/or Complexity of Data Reviewed  Clinical lab tests: ordered and reviewed  Tests in the radiology section of CPT®: reviewed and ordered  Tests in the medicine section of CPT®: reviewed and ordered  Independent visualization of images, tracings, or specimens: yes    Risk of Complications, Morbidity, and/or Mortality  Presenting problems: moderate  Diagnostic procedures: moderate  Management options: moderate    Patient Progress  Patient progress: stable      Final diagnoses:   Recurrent falls   Skin tear of left upper arm without complication, initial encounter       ED Disposition  ED Disposition     None          No follow-up provider specified.       Medication List      No changes were made to your prescriptions during this visit.          Salma Kahn DO  02/15/22 0356

## 2022-02-15 NOTE — ED PROVIDER NOTES
Subjective   Patient is a 89-year-old male with a history of congestive heart failure, pacemaker placement with recent recorded runs of ventricular tachycardia managed medically, 140+ falls over the last several years, hypothyroidism, anemia, asthma, and epilepsy who presents to the emergency department for a fall with head injury.  He had a mechanical fall last night and was seen in the emergency department for right shoulder pain.  He had no acute fractures and was discharged home.  Today he was alone at home where he normally is taken care of by his daughter.  She was at a doctor's appointment.  He went to open the refrigerator door and lost his balance, falling forward and hitting his head on the floor.  He did not lose consciousness.  He had severe frontal head pain and neck pain and a significant amount of bleeding.  He takes a baby aspirin daily.  His daughters have repeatedly asked him to be admitted to be placed in assisted living facilities but he has always refused.  He has been extensively worked up for his imbalance with no obvious source.  Today, he reports headache, neck pain, low back pain, and bilateral shoulder pain.  He denies any paresthesias.          Review of Systems   Constitutional: Negative.    HENT: Negative.    Eyes: Negative.    Respiratory: Negative.    Cardiovascular: Negative.    Gastrointestinal: Negative.    Endocrine: Negative.    Genitourinary: Negative.    Musculoskeletal: Positive for arthralgias, back pain, gait problem, myalgias and neck pain.   Skin: Positive for color change.   Allergic/Immunologic: Negative.    Neurological:        Imbalance   Hematological: Negative.    Psychiatric/Behavioral: Negative.        Past Medical History:   Diagnosis Date   • Anemia    • Arthritis    • Asthma    • CHF (congestive heart failure) ([facility])    • Epilepsy ([facility])    • Falls frequently    • GERD (gastroesophageal reflux disease)    • H/O blood clots    • Heart failure (HCC)    •  Osteoporosis    • Pacemaker    • Rheumatic fever    • Sleep apnea    • Thyroid disease        No Known Allergies    Past Surgical History:   Procedure Laterality Date   • BACK SURGERY     • HAND SURGERY Right    • JOINT REPLACEMENT     • PACEMAKER IMPLANTATION     • REPLACEMENT TOTAL KNEE Bilateral    • TOE AMPUTATION Left        Family History   Problem Relation Age of Onset   • Heart attack Brother        Social History     Socioeconomic History   • Marital status:    Tobacco Use   • Smoking status: Never Smoker   • Smokeless tobacco: Never Used   Vaping Use   • Vaping Use: Never used   Substance and Sexual Activity   • Alcohol use: Never   • Drug use: Never   • Sexual activity: Not Currently           Objective   Physical Exam  Constitutional:       Comments: Uncomfortable appearing   HENT:      Head: Normocephalic.      Comments: Large left-sided frontal hematoma with overlying lacerations     Right Ear: External ear normal.      Left Ear: External ear normal.      Nose:      Comments: Dried blood at bilateral nares, no obvious septal hematoma, tenderness to palpation along the nasal bridge     Mouth/Throat:      Mouth: Mucous membranes are moist.   Eyes:      Extraocular Movements: Extraocular movements intact.   Neck:      Comments: Placed in c-collar, midline tenderness, no obvious step-off  Cardiovascular:      Rate and Rhythm: Normal rate and regular rhythm.   Pulmonary:      Effort: Pulmonary effort is normal. No respiratory distress.      Breath sounds: Normal breath sounds.   Abdominal:      General: Abdomen is flat.      Palpations: Abdomen is soft.      Tenderness: There is no abdominal tenderness.   Genitourinary:     Penis: Normal.       Testes: Normal.   Musculoskeletal:      Comments: Ecchymosis to bilateral anterior shoulders with tenderness to palpation to both joints, decreased range of motion in the right shoulder secondary to pain, intact range of motion in the left shoulder but with  pain, point tenderness in the lumbar spine   Skin:     Comments: Ecchymosis to the left shoulder, hematoma with overlying lacerations to the left frontal scalp   Neurological:      General: No focal deficit present.      Mental Status: He is alert and oriented to person, place, and time. Mental status is at baseline.      Cranial Nerves: No cranial nerve deficit.      Sensory: No sensory deficit.      Comments: Diminished  strength in the right upper extremity which patient says is related to pain in his shoulder from his fall yesterday, normal sensation  5 out of 5  strength and left upper extremity with normal sensation   Psychiatric:         Mood and Affect: Mood normal.         Behavior: Behavior normal.         Laceration Repair    Date/Time: 2/15/2022 8:25 PM  Performed by: Zainab Naqvi PA  Authorized by: Sundeep Childress MD     Consent:     Consent obtained:  Verbal    Consent given by:  Patient    Risks discussed:  Infection, pain, poor cosmetic result, need for additional repair and poor wound healing  Anesthesia (see MAR for exact dosages):     Anesthesia method:  Local infiltration    Local anesthetic:  Lidocaine 1% WITH epi  Laceration details:     Location:  Face    Face location:  Forehead    Length (cm):  5  Repair type:     Repair type:  Complex  Exploration:     Hemostasis achieved with:  Tied off vessels    Wound exploration: wound explored through full range of motion and entire depth of wound probed and visualized      Contaminated: no    Treatment:     Area cleansed with:  Saline    Amount of cleaning:  Standard    Irrigation solution:  Sterile saline    Irrigation volume:  2000    Irrigation method:  Tap    Visualized foreign bodies/material removed: no      Debridement:  None    Undermining:  None    Scar revision: no    Skin repair:     Repair method:  Sutures    Suture size:  4-0    Suture technique:  Simple interrupted  Approximation:     Approximation:   Close  Post-procedure details:     Dressing:  Open (no dressing)    Patient tolerance of procedure:  Tolerated well, no immediate complications               ED Course  ED Course as of 02/15/22 2031   Tue Feb 15, 2022   1844 EKG interpreted by me: Ventricularly paced rhythm,  normal rate, no further interpretation, this is an abnormal EKG [MP]      ED Course User Index  [MP] Sundeep Childress MD                                                 MDM  Number of Diagnoses or Management Options  Closed odontoid fracture, initial encounter ([facility])  Complex laceration of face, initial encounter  Frequent falls  Other closed fracture of eighth thoracic vertebra, initial encounter ([facility])  Other closed fracture of third thoracic vertebra, initial encounter ([facility])  Diagnosis management comments: Patient is a 89-year-old male with a history of CHF, ventricular tachycardia, frequent falls, seizure disorder, and hypothyroidism who presents to the emergency department for a mechanical fall.  He lost his balance while opening the refrigerator and fell forward with no LOC.  On a daily aspirin.  On arrival he was placed in a c-collar. Vitals are normal, alert and oriented, breath sounds present bilaterally, no abdominal tenderness. Reports headache, neck pain, bilateral shoulder pain, and low back pain on exam.  Work-up included basic labs, COVID-19 testing, EKG, CT head, CT C-spine, CTA chest, CT abdomen pelvis, reconstitution of the thoracic and lumbar spines.  Patient given fentanyl for pain.  Tdap is up-to-date.  Arterial bleeder within his hematoma was tied off prior to going to scan.  CT called me and stated they believe they see a C2 fracture.  CTA head and neck added on.  Head CT is negative for bleed or fracture.  Cervical CT shows a markedly angulated and displaced unstable fracture involving the odontoid process of C2, specifically with significant anterior subluxation of the C1-C2 facet joints bilaterally.  Thoracic scan  shows age-indeterminate superior endplate fractures involving T1 and T2 as well as a chronic appearing deformity of T12 and L1.  However, he also has what appears to be acute fractures of the inferior endplate of T3 and both the superior and inferior endplate of T8.  No acute lumbar fractures.  Call put out to  trauma pending CTA head, neck, chest abdomen and pelvis reads.  Dr. Avila at  accepted the patient as a transfer.           Final diagnoses:   Closed odontoid fracture, initial encounter (HCC)   Other closed fracture of third thoracic vertebra, initial encounter (HCC)   Other closed fracture of eighth thoracic vertebra, initial encounter (HCC)   Complex laceration of face, initial encounter   Frequent falls       ED Disposition  ED Disposition     ED Disposition Condition Comment    Transfer to Another Facility             No follow-up provider specified.       Medication List      No changes were made to your prescriptions during this visit.          Zainab Naqvi PA  02/15/22 2031

## 2022-03-30 PROBLEM — N31.9 NEUROMUSCULAR DYSFUNCTION OF BLADDER, UNSPECIFIED: Status: ACTIVE | Noted: 2022-01-01

## 2022-03-30 PROBLEM — G30.9 ALZHEIMER'S DISEASE, UNSPECIFIED (CODE): Status: ACTIVE | Noted: 2022-01-01

## 2022-03-30 PROBLEM — G40.911 EPILEPSY, UNSPECIFIED, INTRACTABLE, WITH STATUS EPILEPTICUS (HCC): Status: ACTIVE | Noted: 2022-01-01

## 2022-03-30 PROBLEM — Z98.1 HISTORY OF LUMBAR SPINAL FUSION: Status: ACTIVE | Noted: 2022-01-01

## 2022-03-30 PROBLEM — K21.9 GERD WITHOUT ESOPHAGITIS: Status: ACTIVE | Noted: 2022-01-01

## 2022-03-30 PROBLEM — F33.8 OTHER RECURRENT DEPRESSIVE DISORDERS (HCC): Status: ACTIVE | Noted: 2022-01-01

## 2022-03-30 PROBLEM — F06.4 ANXIETY DISORDER DUE TO KNOWN PHYSIOLOGICAL CONDITION: Status: ACTIVE | Noted: 2022-01-01

## 2022-03-30 PROBLEM — H91.93 UNSPECIFIED HEARING LOSS, BILATERAL: Status: ACTIVE | Noted: 2022-01-01

## 2022-03-30 PROBLEM — F02.80 DEMENTIA IN OTHER DISEASES CLASSIFIED ELSEWHERE WITHOUT BEHAVIORAL DISTURBANCE: Status: ACTIVE | Noted: 2022-01-01

## 2022-04-03 NOTE — PROGRESS NOTES
Nursing Home Progress Note        Pavan Kulkarni DO []  [facility] Sauer []  [address] [place] [address]  Phone: [phone]  Fax: [phone] Zach Savage MD []  Wolfgang Sutton DO []  Tawny Galvez PA-C [x]   [address] [place] [address]  Phone: [phone]  Fax: [phone]     PATIENT NAME: Devan Yu                                                                          YOB: 1928           DATE OF SERVICE: 3/31/2022  FACILITY: [facility]    CHIEF COMPLAINT:  Follow up on debility, recurrent falls.       HISTORY OF PRESENT ILLNESS:   Mr. Hartman is a 89 y/o male with PMH of atrial fibrillation, aortic stenosis, cardiomyopathy, HTN, thrombocytopenia, hypothyroidism, BPH, CKD, physical debility and recurrent falls.  He is currently residing at this SNF under hospice care.  Patient with recent history of fall, subsequently transferred to  where he was diagnosed with T3 compression fracture, rib fractures,and SDH.  Ultimately family elected for hospice care, transferred to [facility] in [place].  Patient condition stabilized, where he was transferred to this SNF for continued care.  GOC to remain under hospice care, unless he continues to improve where family are open to discharging from hospice and potentially perusing PT/OT.  Nursing share patient has remained in stable condition without acute changes.  He continues to require assistance with most ADLs.  Appetite fair, has required some assistance with feeding.     PAST MEDICAL & SURGICAL HISTORY:   Past Medical History:   Diagnosis Date   • Anemia    • Arthritis    • Asthma    • CHF (congestive heart failure) (HCC)    • Epilepsy (HCC)    • Falls frequently    • GERD (gastroesophageal reflux disease)    • H/O blood clots    • Heart failure (HCC)    • Osteoporosis    • Pacemaker    • Rheumatic fever    • Sleep apnea    • Thyroid disease       Past Surgical History:   Procedure Laterality Date   • BACK  SURGERY     • HAND SURGERY Right    • JOINT REPLACEMENT     • PACEMAKER IMPLANTATION     • REPLACEMENT TOTAL KNEE Bilateral    • TOE AMPUTATION Left          MEDICATIONS:  I have reviewed and reconciled the patients medication list in the patients chart at the skilled nursing facility today.      ALLERGIES:  No Known Allergies      SOCIAL HISTORY:  Social History     Socioeconomic History   • Marital status:    Tobacco Use   • Smoking status: Never Smoker   • Smokeless tobacco: Never Used   Vaping Use   • Vaping Use: Never used   Substance and Sexual Activity   • Alcohol use: Never   • Drug use: Never   • Sexual activity: Not Currently       FAMILY HISTORY:  Family History   Problem Relation Age of Onset   • Heart attack Brother        REVIEW OF SYSTEMS:    Unable to preform ROS due to dementia.     PHYSICAL EXAMINATION:     VITAL SIGNS:  /60   Pulse 86   Temp 97.2 °F (36.2 °C)   Resp 18   Wt 63.9 kg (140 lb 14.4 oz)   SpO2 97%   BMI 22.07 kg/m²     Nursing notes and vital signs reviewed.   General Appearance:  Frail appearing elderly male lying in bed, NAD.    Head: Normocephalic and atraumatic, without obvious abnormality     Eyes: PERRLA.  Conjunctivae and sclerae normal.  EOM are within normal limits.    Neck: Limited ROM due to use of C collar.  Neck supple. No JVD present.   Cardiovascular: Normal rate, regular rhythm.  Pulses palpable equal bilaterally.    Pulmonary/Chest: Effort normal and breath sounds normal. No respiratory distress.   Abdominal: Soft. Bowel sounds are normal. No distention or tenderness.     Musculoskeletal: Normal range of motion. No edema.   Neurological: Alert, disoriented.    Skin: Skin is warm and dry.   Psychiatric:  Normal mood and affect. Normal behavior     RECORDS REVIEW:   N/A    ASSESSMENT     Diagnoses and all orders for this visit:    1. Alzheimer's disease, unspecified (CODE) (HCC) (Primary)    2. Frailty    3. Recurrent falls    4. Essential  hypertension    5. Hospice care patient        PLAN  Patient appears comfortable and without acute distress.  Family at bedside deny any acute concerns or complaints.  Will continue with conservative management, regular follow up with hospice.  Fall precautions in place.  He continues to require assistance with most all ADLs.  Will proceed with weights as per facility protocol, admission labs.  No medication changes made, continue with as needed pain control regimen.  Continue to monitor closely, nursing encouraged to contact myself or MD with any acute changes in condition.       [x]  Discussed Patient in detail with nursing/staff, addressed all needs today.     [x]  Plan of Care Reviewed   []  PT/OT Reviewed   []  Order Changes  []  Discharge Plans Reviewed  [x]  Advance Directive on file with Nursing Home.   [x]  POA on file with Nursing Home.    [x]  Code Status: DNR         Tawny Galvez PA-C.  4/3/2022

## 2022-04-10 NOTE — PROGRESS NOTES
Nursing Home History and Physical        Pavankalpana Kulkarni DO []  CLEO Sauer []  [address] [place] [address]  Phone: [phone]  Fax: [phone] Zach Savage MD[x]  Wolfgang Sutton DO []   GT Burns []    [address] [place] [address]  Phone: [phone]  Fax: [phone]     PATIENT NAME: Devan Yu                                                                          YOB: 1928           DATE OF SERVICE: 04/06/2022  FACILITY:   [x] [facility] [] [facility]  [] [facility]        ______________________________________________________________________    CHIEF COMPLAINT:   Initial visit, status post fall    Subjective     HISTORY OF PRESENT ILLNESS:   Mr. Yu is a 89 years old gentleman with history of heart failure, unspecified, gastroesophageal reflux disease, osteoarthritis, sleep apnea hypopnea syndrome, hypertension and epilepsy who was hospitalized at [facility] with a diagnosis of complex cervical and thoracic spine fractures as documented above.  Management options were discussed with family who elected to proceed with conservative measures in the form of application of c-collar and deferring surgical intervention.      Subsequently, goals of care were discussed and concluded to proceeding with comfort measures with hospice services.  Given his complex clinical course, transfer to the [facility] was felt most appropriate.   However, patient did not meet eligibility criteria for general inpatient admission [GIP], therefore family were in agreement to proceed with residential stay status.                          Mr. Yu was admitted to the [facility] with residential stay status on February 22.   His clinical course waxed and waned between increasing daytime sleepiness, periodic lethargy alternating with periods of alertness.  Generally, patient remained pleasantly confused, did have episodic  hallucinations and restlessness which were brief and resolved with minimal intervention.  Patient did not have any episodes of agitation or combativeness.  He is bedbound and requires total care.  C-collar was maintained as recommended except for times of meals and grooming.  Sutures initially present on forehead were removed.  Overall, patient remained in stable clinical condition with no episodes of fever, chills or other acute systemic issues.  He however had 1 seizure episode on March 23 with subsequent prolonged postictal state during which he was noted with chest congestion and poor responsiveness which lasted for approximately 24 hours.  Subsequently, patient regained his baseline mental and clinical status with resolution of above findings.      During his stay at the [facility], goals of care were discussed repeatedly during which family confirmed their decision to maintain comfort measures.  Additionally, as patient maintained a stable condition, discharge plans were discussed, they were in agreement to consider discharge to a skilled nursing facility contingent with bed availability.  [facility] and [facility] graciously accepted him and accordingly transfer was coordinated on March 30, 2022.    Patient remained in stable condition with no reported fever, chills or other acute systemic issues.  During my visit, patient was lying comfortably in his bed, responded to calling his name, was pleasantly confused and denied complaints.                                                                                                PAST MEDICAL & SURGICAL HISTORY:   Past Medical History:   Diagnosis Date   • Anemia    • Arthritis    • Asthma    • CHF (congestive heart failure) (HCC)    • Epilepsy (HCC)    • Falls frequently    • GERD (gastroesophageal reflux disease)    • H/O blood clots    • Heart failure (HCC)    • Osteoporosis    • Pacemaker    • Rheumatic fever    • Sleep apnea    •  Thyroid disease       Past Surgical History:   Procedure Laterality Date   • BACK SURGERY     • HAND SURGERY Right    • JOINT REPLACEMENT     • PACEMAKER IMPLANTATION     • REPLACEMENT TOTAL KNEE Bilateral    • TOE AMPUTATION Left          MEDICATIONS:  I have reviewed and reconciled the patients medication list in the patients chart at the skilled nursing facility today.      ALLERGIES:  No Known Allergies      SOCIAL HISTORY:  Social History     Socioeconomic History   • Marital status:    Tobacco Use   • Smoking status: Never Smoker   • Smokeless tobacco: Never Used   Vaping Use   • Vaping Use: Never used   Substance and Sexual Activity   • Alcohol use: Never   • Drug use: Never   • Sexual activity: Not Currently       FAMILY HISTORY:  Family History   Problem Relation Age of Onset   • Heart attack Brother        REVIEW OF SYSTEMS:  Weakness, deconditioning, limited mobility  All other systems were reviewed and are negative.    Objective     PHYSICAL EXAMINATION:   Vital signs: /61, HR 80/min, RR 18/min, temp 97.3 °F, O2 sat 95% on 2 L nasal cannula    Physical Exam    General Appearance:  Patient appears chronically ill, no signs of distress noted.   Head and neck:  C-collar in place, otherwise atraumatic normocephalic   Eyes:      PERRLA, conjunctivae and sclerae without obvious abnormality.   Lungs:   Breath sounds are equal bilaterally. No crackles or wheezing noted.   Heart:  Normal S1 and S2, no murmur, no gallop, no rub. No JVD.   Abdomen:   Soft, nontender, non distended, no guarding or rebound tenderness.  Bowel sounds are normal, no organomegaly.     Extremities: No edema, cyanosis or clubbing.     Musculoskeletal: Deconditioning noted.     Skin: No wounds or rash noted.     Neurologic:  Psychiatric:                           Baseline confusion confusion, otherwise grossly non focal.   Calm affect.         RECORDS REVIEW:   I have reviewed available medical records.    Assessment And Plan      ASSESSMENT AND PLAN:  Diagnoses and all orders for this visit:    1. Closed fracture of cervical vertebra, unspecified cervical vertebral level, subsequent encounter (Primary)  Comments:  Continue c-collar till mid May as recommended by surgery at [facility].  Patient also has thoracic spine fractures, stable at present.    2. Essential hypertension  Comments:  Controlled on metoprolol, 12.5 mg p.o. twice daily    3. Seizure disorder (HCC)  Comments:  Continue phenytoin 100 mg p.o. 3 times daily    4. Frequent falls  Comments:  Fall precautions observed    5. Alzheimer's disease, unspecified (CODE) (HCC)  Comments:  No behavioral issues noted    6. Hospice care patient  Comments:  Followed by hospice services in coordination with facility staff      GENERAL MANAGEMENT:    [x]  Plan of Care Reviewed   [x]  Aspiration and fall precautions  [x]  Nutritional support and hydration  [x]  Bowel hygiene  [x]  Skin and wound care, strict offloading and pressure relief  [x]  PT/OT Reviewed   [x]  Code Status listed on patients chart at the nursing home facility  [x]  Discussed in detail with nursing/staff, addressed all needs today              Zach Savage MD.

## 2022-04-19 NOTE — PROGRESS NOTES
Nursing Home Progress Note        Pavan Kulkarni DO []  CLEO Sauer []  [address] [place] [address]  Phone: [phone]  Fax: [phone] Zach Savage MD []  Wolfgang Sutton DO []  Tawny Galvez PA-C [x]   [address] [place] [address]  Phone: [phone]  Fax: [phone]     PATIENT NAME: Devan Yu                                                                          YOB: 1928           DATE OF SERVICE: 4/18/2022  FACILITY: [facility]    CHIEF COMPLAINT:  Follow up on debility.       HISTORY OF PRESENT ILLNESS:   Mr. Hartman is a 89 y/o male with PMH of atrial fibrillation, aortic stenosis, cardiomyopathy, HTN, thrombocytopenia, hypothyroidism, BPH, CKD, physical debility and recurrent falls.  He is currently residing at this SNF under hospice care.  Patient with recent history of fall, subsequently transferred to  where he was diagnosed with T3 compression fracture, rib fractures,and SDH.  Ultimately family elected for hospice care, transferred to [facility] in [place].  Patient condition stabilized, where he was transferred to this SNF for continued care.      Nursing shared that patient has remained in stable condition without acute changes.  Labs obtained on 4/11 largely unremarkable.  He is tolerating use of c-collar.  Continues with use of as needed comfort medications.  Family at bedside today, inquiring as to when patient can undergo PT/OT assessment.  He continues to require assistance with most ADLs.  Appetite fair, has required some assistance with feeding.     PAST MEDICAL & SURGICAL HISTORY:   Past Medical History:   Diagnosis Date   • Anemia    • Arthritis    • Asthma    • CHF (congestive heart failure) (HCC)    • Epilepsy (HCC)    • Falls frequently    • GERD (gastroesophageal reflux disease)    • H/O blood clots    • Heart failure (HCC)    • Osteoporosis    • Pacemaker    • Rheumatic fever    • Sleep apnea    • Thyroid disease        Past Surgical History:   Procedure Laterality Date   • BACK SURGERY     • HAND SURGERY Right    • JOINT REPLACEMENT     • PACEMAKER IMPLANTATION     • REPLACEMENT TOTAL KNEE Bilateral    • TOE AMPUTATION Left          MEDICATIONS:  I have reviewed and reconciled the patients medication list in the patients chart at the skilled nursing facility today.      ALLERGIES:  No Known Allergies      SOCIAL HISTORY:  Social History     Socioeconomic History   • Marital status:    Tobacco Use   • Smoking status: Never Smoker   • Smokeless tobacco: Never Used   Vaping Use   • Vaping Use: Never used   Substance and Sexual Activity   • Alcohol use: Never   • Drug use: Never   • Sexual activity: Not Currently       FAMILY HISTORY:  Family History   Problem Relation Age of Onset   • Heart attack Brother        REVIEW OF SYSTEMS:    Unable to preform ROS due to dementia.     PHYSICAL EXAMINATION:     VITAL SIGNS:  /60   Pulse 74   Temp 97.2 °F (36.2 °C)   Resp 18   Wt 64.9 kg (143 lb)   SpO2 94%   BMI 22.40 kg/m²     Nursing notes and vital signs reviewed.   General Appearance:  Frail appearing elderly male lying in bed, NAD.    Head: Normocephalic and atraumatic, without obvious abnormality     Eyes: PERRLA.  Conjunctivae and sclerae normal.  EOM are within normal limits.    Neck: Limited ROM due to use of C collar.  Neck supple. No JVD present.   Cardiovascular: Normal rate, regular rhythm.  Pulses palpable equal bilaterally.    Pulmonary/Chest: Effort normal and breath sounds normal. No respiratory distress.   Abdominal: Soft. Bowel sounds are normal. No distention or tenderness.     Musculoskeletal: Normal range of motion. No edema.   Neurological: Alert, disoriented.    Skin: Skin is warm and dry.   Psychiatric:  Normal mood and affect. Normal behavior     RECORDS REVIEW:   4/11: Sodium 144, potassium 4.3, BUN 20, creatinine 0.8, hemoglobin 9.3, hematocrit 28.0    ASSESSMENT     Diagnoses and all orders for  this visit:    1. Alzheimer's disease, unspecified (CODE) (HCC) (Primary)    2. Frailty    3. Recurrent falls    4. Iron deficiency anemia, unspecified iron deficiency anemia type    5. Essential hypertension    6. Closed fracture of cervical vertebra, unspecified cervical vertebral level, subsequent encounter        PLAN  Alzheimer's/frailty/recurrent falls: Chronic, no acute events reported by nursing he continues to require assistance with all ADLs.  He continues following with hospice, symptoms well controlled with as needed comfort meds.  As per previous goals of care, family wish to proceed with PT/OT evaluation.  If patient is able to participate with therapy services, they are agreeable to discontinuing hospice and proceeding with palliative care.  Discussed with nursing who have facilitated with hospice and therapy services.  Will place consult for evaluation.  Continue with fall precautions and close monitoring.    Anemia: Chronic, stable.  H&H reviewed.    Hypertension: Chronic, controlled with metoprolol.  Would not recommend tight blood pressure control given his history of recurrent falls and risk for orthostasis.  Adjust as clinically indicated.    Cervical fracture: Tolerating use of c-collar, will occasionally need breaks due to causing agitation.  He is slated to follow-up with  in May.  Continue to monitor.      [x]  Discussed Patient in detail with nursing/staff, addressed all needs today.     [x]  Plan of Care Reviewed   []  PT/OT Reviewed   []  Order Changes  []  Discharge Plans Reviewed  [x]  Advance Directive on file with Nursing Home.   [x]  POA on file with Nursing Home.    [x]  Code Status: DNR         Tawny Galvez PA-C.  4/19/2022

## 2022-04-29 NOTE — PROGRESS NOTES
Nursing Home Progress Note        Pavan Kulkarni DO []  CLEO Sauer []  [address] [place] [address]  Phone: [phone]  Fax: [phone] Zach Savage MD []  Wolfgang Sutton DO []  Tawny Galvez PA-C [x]   [address] [place] [address]  Phone: [phone]  Fax: [phone]     PATIENT NAME: Devan Yu                                                                          YOB: 1928           DATE OF SERVICE: 4/25/2022  FACILITY: [facility]    CHIEF COMPLAINT:  Physical debility.       HISTORY OF PRESENT ILLNESS:   Mr. Hartman is a 89 y/o male with PMH of atrial fibrillation, aortic stenosis, cardiomyopathy, HTN, thrombocytopenia, hypothyroidism, BPH, CKD, physical debility and recurrent falls.  He is currently residing at this SNF under hospice care.  Patient with recent history of fall, subsequently transferred to  where he was diagnosed with T3 compression fracture, rib fractures,and SDH.  Ultimately family elected for hospice care, transferred to [facility] in [place].  Patient condition stabilized, where he was transferred to this SNF for continued care.      Patient has continued in stable condition.  He is tolerating use of c-collar.  He recently began working with therapy services.  Nursing shared that patient has remained in stable condition without acute changes.  Labs obtained on 4/11 largely unremarkable.  He is tolerating use of c-collar.  Appetite/weight stable.  Family expressed desire to pursue PT/OT, for which referral placed.  At baseline, cognition is poor.   He requires assistance with most all ADLs.      PAST MEDICAL & SURGICAL HISTORY:   Past Medical History:   Diagnosis Date   • Anemia    • Arthritis    • Asthma    • CHF (congestive heart failure) (HCC)    • Epilepsy (HCC)    • Falls frequently    • GERD (gastroesophageal reflux disease)    • H/O blood clots    • Heart failure (HCC)    • Osteoporosis    • Pacemaker    • Rheumatic  fever    • Sleep apnea    • Thyroid disease       Past Surgical History:   Procedure Laterality Date   • BACK SURGERY     • HAND SURGERY Right    • JOINT REPLACEMENT     • PACEMAKER IMPLANTATION     • REPLACEMENT TOTAL KNEE Bilateral    • TOE AMPUTATION Left          MEDICATIONS:  I have reviewed and reconciled the patients medication list in the patients chart at the skilled nursing facility today.      ALLERGIES:  No Known Allergies      SOCIAL HISTORY:  Social History     Socioeconomic History   • Marital status:    Tobacco Use   • Smoking status: Never Smoker   • Smokeless tobacco: Never Used   Vaping Use   • Vaping Use: Never used   Substance and Sexual Activity   • Alcohol use: Never   • Drug use: Never   • Sexual activity: Not Currently       FAMILY HISTORY:  Family History   Problem Relation Age of Onset   • Heart attack Brother        REVIEW OF SYSTEMS:    Unable to preform ROS due to dementia.     PHYSICAL EXAMINATION:     VITAL SIGNS:  /81   Pulse 70   Temp 97.2 °F (36.2 °C)   Resp 20   Wt 64 kg (141 lb 3.2 oz)   SpO2 94%   BMI 22.12 kg/m²     Nursing notes and vital signs reviewed.   General Appearance:  Frail appearing elderly male lying in bed, NAD.    Head: Normocephalic and atraumatic, without obvious abnormality     Eyes: PERRLA.  Conjunctivae and sclerae normal.  EOM are within normal limits.    Neck: C collar In place.  Neck supple. No JVD present.   Cardiovascular: Normal rate, regular rhythm.  Pulses palpable equal bilaterally.    Pulmonary/Chest: Effort normal and breath sounds normal. No respiratory distress.   Abdominal: Soft. Bowel sounds are normal. No distention or tenderness.     Musculoskeletal: Normal range of motion. No edema.   Neurological: Sleeping, awakens briefly.    Skin: Skin is warm and dry.   Psychiatric:  Normal mood and affect. Normal behavior     RECORDS REVIEW:   4/25:  Na 144, K 4.3, Bun 22, Cr 0.8, WBC 9.0, Hgb 10.2, Hct 30.7  4/11: Sodium 144,  potassium 4.3, BUN 20, creatinine 0.8, hemoglobin 9.3, hematocrit 28.0    ASSESSMENT     Diagnoses and all orders for this visit:    1. Alzheimer's disease, unspecified (CODE) (HCC) (Primary)    2. Frailty    3. Recurrent falls    4. Iron deficiency anemia, unspecified iron deficiency anemia type    5. Essential hypertension    6. Closed fracture of cervical vertebra, unspecified cervical vertebral level, subsequent encounter    7. Hospice care patient        PLAN  Alzheimer's/frailty/recurrent falls: Chronic, no acute events reported by nursing.  Patient continues to require assistance with most all ADLs.  GOC conservative and focused on comfort and symptom management.  He continues following with hospice, symptoms well controlled with as needed comfort meds.  Patient currently being evaluated by PT/OT, for strengthening.  Will continue with fall precautions and close monitoring.    Anemia: Chronic, stable.  H&H reviewed.    Hypertension: Chronic, controlled with metoprolol.  Would not recommend tight blood pressure control given his history of recurrent falls.  Adjust as clinically indicated.    Cervical fracture: Tolerating use of c-collar.  Pain well controlled.  He is slated to follow-up with  in May.  Continue to monitor.      [x]  Discussed Patient in detail with nursing/staff, addressed all needs today.     [x]  Plan of Care Reviewed   []  PT/OT Reviewed   []  Order Changes  []  Discharge Plans Reviewed  [x]  Advance Directive on file with Nursing Home.   [x]  POA on file with Nursing Home.    [x]  Code Status: DNR         Tawny Galvez PA-C.  4/29/2022

## 2022-04-29 NOTE — TELEPHONE ENCOUNTER
(DR. LYLES PT)    [facility] REQUESTING MED REFILL FOR MORPHINE SULFATE 100 MG/5 ML.    DIRECTIONS: MORPHINE SULFATE 100 MG/5 ML GIVE 0.25 ML Q 3 HRS PRN.

## 2022-05-10 NOTE — PROGRESS NOTES
Nursing Home Progress Note        Pavan Kulkarni DO []  CLEO Sauer []  [address] [place] [address]  Phone: [phone]  Fax: [phone] Zach Savage MD []  Wolfgang Sutton DO []  Tawny Galvez PA-C [x]   [address] [place] [address]  Phone: [phone]  Fax: [phone]     PATIENT NAME: Devan Yu                                                                          YOB: 1928           DATE OF SERVICE: 5/9/2022  FACILITY: [facility]    CHIEF COMPLAINT:  Chronic medical management long-term care needs.      HISTORY OF PRESENT ILLNESS:   Mr. Hartman is a 89 y/o male with PMH of atrial fibrillation, aortic stenosis, cardiomyopathy, HTN, thrombocytopenia, hypothyroidism, BPH, CKD, physical debility and recurrent falls.  He is currently residing at this SNF under hospice care.  Patient with recent history of fall, subsequently transferred to  where he was diagnosed with T3 compression fracture, rib fractures,and SDH.  Ultimately family elected for hospice care, transferred to [facility] in [place].  Patient condition stabilized, where he was transferred to this SNF for continued care.      Patient presents today for routine coordination of care and long-term care needs.  Record reviewed and care discussed with staff.  Weights reviewed and have remained stable with average meal intake of 88%.  He continues under hospice care, with as needed comfort medications in place.  He does continue to require assistance with most all ADLs, including feeding.  He did require dietary change to honey thick liquids.  Nursing note he has been sleeping more throughout the day, overall appearing comfortable.  Upon assessment, he is sleeping and does not awaken throughout assessment.  He appears comfortable and without any distress.       PAST MEDICAL & SURGICAL HISTORY:   Past Medical History:   Diagnosis Date   • Anemia    • Arthritis    • Asthma    • CHF (congestive  heart failure) (HCC)    • Epilepsy (HCC)    • Falls frequently    • GERD (gastroesophageal reflux disease)    • H/O blood clots    • Heart failure (HCC)    • Osteoporosis    • Pacemaker    • Rheumatic fever    • Sleep apnea    • Thyroid disease       Past Surgical History:   Procedure Laterality Date   • BACK SURGERY     • HAND SURGERY Right    • JOINT REPLACEMENT     • PACEMAKER IMPLANTATION     • REPLACEMENT TOTAL KNEE Bilateral    • TOE AMPUTATION Left          MEDICATIONS:  I have reviewed and reconciled the patients medication list in the patients chart at the skilled nursing facility today.      ALLERGIES:  No Known Allergies      SOCIAL HISTORY:  Social History     Socioeconomic History   • Marital status:    Tobacco Use   • Smoking status: Never Smoker   • Smokeless tobacco: Never Used   Vaping Use   • Vaping Use: Never used   Substance and Sexual Activity   • Alcohol use: Never   • Drug use: Never   • Sexual activity: Not Currently       FAMILY HISTORY:  Family History   Problem Relation Age of Onset   • Heart attack Brother        REVIEW OF SYSTEMS:    Unable to preform ROS due to dementia.     PHYSICAL EXAMINATION:     VITAL SIGNS:  /66   Pulse 86   Temp 97.7 °F (36.5 °C)   Resp 20   Wt 64.9 kg (143 lb 1.6 oz)   SpO2 97%   BMI 22.41 kg/m²     Nursing notes and vital signs reviewed.   General Appearance:  Frail appearing elderly male lying in bed, NAD.    Head: Normocephalic and atraumatic, without obvious abnormality     Eyes: PERRLA.  Conjunctivae and sclerae normal.  EOM are within normal limits.    Neck: C collar In place.  Neck supple. No JVD present.   Cardiovascular: Normal rate, regular rhythm.  Pulses palpable equal bilaterally.    Pulmonary/Chest: Effort normal and breath sounds normal. No respiratory distress.   Abdominal: Soft. Bowel sounds are normal. No distention or tenderness.     Musculoskeletal: Normal range of motion. No edema.   Neurological: Sleeping.   Skin: Skin is  warm and dry.   Psychiatric:  Calm affect without agitation.      RECORDS REVIEW:   4/25:  Na 144, K 4.3, Bun 22, Cr 0.8, WBC 9.0, Hgb 10.2, Hct 30.7  4/11: Sodium 144, potassium 4.3, BUN 20, creatinine 0.8, hemoglobin 9.3, hematocrit 28.0    ASSESSMENT     Diagnoses and all orders for this visit:    1. Alzheimer's disease, unspecified (CODE) (HCC) (Primary)    2. Frailty    3. Recurrent falls    4. Iron deficiency anemia, unspecified iron deficiency anemia type    5. Essential hypertension    6. Closed fracture of cervical vertebra, unspecified cervical vertebral level, subsequent encounter    7. Hospice care patient        PLAN  Alzheimer's/frailty/recurrent falls:  Patient appears more frail today upon assessment, has been sleeping more throughout the day.  He continues to require assistance with most all ADLs, including feeding.  GOC conservative with focus on comfort and symptom management.  He continues following with hospice, symptoms well controlled with PRN comfort meds.  Fall precautions in place.  Weights as per facility protocol.  Continue with close monitoring, contact myself or MD with any acute changes.     Anemia: Chronic, clinically stable.      Hypertension: Chronic, controlled with metoprolol.  Would not recommend tight blood pressure control given his history of recurrent falls.  Adjust as clinically indicated.    Cervical fracture: Tolerating use of c-collar.  Pain well controlled.  He is slated to follow-up with  this month.        [x]  Discussed Patient in detail with nursing/staff, addressed all needs today.     [x]  Plan of Care Reviewed   []  PT/OT Reviewed   []  Order Changes  []  Discharge Plans Reviewed  [x]  Advance Directive on file with Nursing Home.   [x]  POA on file with Nursing Home.    [x]  Code Status: DNR         Tawny Galvez PA-C.  5/10/2022

## 2022-05-10 NOTE — TELEPHONE ENCOUNTER
[facility] REQUESTING MED REFILL FOR LORAZEPAM INTENSOL 0.25 ML.    DIRECTIONS: LORAZEPAM INTENSOL 0.25 ML SL PO Q 3 HRS PRN.

## 2022-05-13 NOTE — TELEPHONE ENCOUNTER
(NEW SCIPT)    [facility] REQUESTING MED REFILL FOR MORPHINE SULFATE 100 MG/5 ML.    DIRECTIONS: MORPHINE SULFATE 0.5 MLS Q 4 HRS.